# Patient Record
Sex: FEMALE | NOT HISPANIC OR LATINO | Employment: UNEMPLOYED | URBAN - METROPOLITAN AREA
[De-identification: names, ages, dates, MRNs, and addresses within clinical notes are randomized per-mention and may not be internally consistent; named-entity substitution may affect disease eponyms.]

---

## 2020-02-03 ENCOUNTER — HOSPITAL ENCOUNTER (OUTPATIENT)
Dept: PERIOP | Facility: HOSPITAL | Age: 25
Setting detail: HOSPITAL OUTPATIENT SURGERY
Discharge: HOME | End: 2020-02-03
Attending: OBSTETRICS & GYNECOLOGY

## 2020-03-26 ENCOUNTER — OFFICE VISIT CONVERTED (OUTPATIENT)
Dept: FAMILY MEDICINE CLINIC | Facility: CLINIC | Age: 25
End: 2020-03-26
Attending: PHYSICIAN ASSISTANT

## 2020-03-26 ENCOUNTER — CONVERSION ENCOUNTER (OUTPATIENT)
Dept: FAMILY MEDICINE CLINIC | Facility: CLINIC | Age: 25
End: 2020-03-26

## 2020-05-07 ENCOUNTER — OFFICE VISIT CONVERTED (OUTPATIENT)
Dept: FAMILY MEDICINE CLINIC | Facility: CLINIC | Age: 25
End: 2020-05-07
Attending: PHYSICIAN ASSISTANT

## 2021-05-13 NOTE — PROGRESS NOTES
Progress Note      Patient Name: Ashley Hoffman   Patient ID: [id number]   Sex: Female   YOB: 1995    Primary Care Provider: Tennille DEVINE   Referring Provider: Tennille DEVINE    Visit Date: May 7, 2020    Provider: SYBIL Avery   Location: [facility]   Location Address: [address] [place] [address], [address]  [place], [place]  [id number]   Location Phone: [phone]          Chief Complaint  · Pt here to f/u on anxiety      History Of Present Illness  Ashley Hoffman is a 25 year old female who presents for evaluation and treatment of:      Anxiety: Pt states since starting Celexa 10mg she hasn't reallty felt a difference, she feels about the same. Patient states that her symptoms range from throat tightening to hands tingling. She states that symptoms are not daily but occur several times a week. She complains of fatigue upon waking and sometimes wants to go back to bed. She does feel a bit down but wouldn't say depressed. She denies SI/HI.       Past Medical History  Disease Name Date Onset Notes   Anxiety disorder --  --          Past Surgical History  Procedure Name Date Notes   Dilatation and curettage --  --          Medication List  Name Date Started Instructions   Celexa 10 mg oral tablet 03/26/2020 take 1 tablet (10 mg) by oral route once daily for 30 days         Allergy List  Allergen Name Date Reaction Notes   NO KNOWN DRUG ALLERGIES --  --  --        Allergies Reconciled  Family Medical History  Disease Name Relative/Age Notes   *No Known Family History  --          Social History  Finding Status Start/Stop Quantity Notes   Alcohol Current some day --/-- socially --    Exercises regularly --  --/-- --  --    Single --  --/-- --  --    Tobacco Never --/-- --  --          Immunizations  NameDate Admin Mfg Trade Name Lot Number Route Inj VIS Given VIS Publication   InfluenzaRefused 03/26/2020 NE Not Entered  NE NE     Comments:          Review of  Systems  · Constitutional  o Admits  o : fatigue  o Denies  o : fever, weight loss, weight gain  · Cardiovascular  o Denies  o : lower extremity edema, claudication, chest pressure, palpitations  · Respiratory  o Denies  o : shortness of breath, wheezing, cough, hemoptysis, dyspnea on exertion  · Gastrointestinal  o Admits  o : nausea  o Denies  o : vomiting, diarrhea, constipation, abdominal pain  · Psychiatric  o Admits  o : anxiety  o Denies  o : suicidal ideation, homicidal ideation      Vitals  Date Time BP Position Site L\R Cuff Size HR RR TEMP (F) WT  HT  BMI kg/m2 BSA m2 O2 Sat        05/07/2020 09:42 /62 Sitting    81 - R  98.1 104lbs 0oz 5'   20.31 1.41 100 %          Physical Examination  · Constitutional  o Appearance  o : well developed, well-nourished, no acute distress  · Head and Face  o Head  o : normocephalic, atraumatic  · Neck  o Inspection/Palpation  o : normal appearance, no masses or tenderness, trachea midline  o Thyroid  o : gland size normal, nontender, no nodules or masses present on palpation  · Respiratory  o Respiratory Effort  o : breathing unlabored  o Inspection of Chest  o : chest rise symmetric bilaterally  o Auscultation of Lungs  o : clear to auscultation bilaterally throughout inspiration and expiration  · Cardiovascular  o Heart  o :   § Auscultation of Heart  § : regular rate and rhythm, no murmurs, gallops or rubs  o Peripheral Vascular System  o :   § Extremities  § : no edema  · Lymphatic  o Neck  o : no cervical lymphadenopathy, no supraclavicular lymphadenopathy  · Psychiatric  o Mood and Affect  o : mood normal, affect appropriate          Assessment  · Anxiety disorder     300.00/F41.9  Continue with Celexa at present. Obtain GeneSight testing.  · Fatigue     780.79/R53.83  Check labs.      Plan  · Orders  o CBC with Auto Diff [facility] (90372) - 780.79/R53.83 - 05/07/2020  o CMP [facility] (26367) - 780.79/R53.83 - 05/07/2020  o Thyroid Profile (29811, 85922, THYII) -  780.79/R53.83 - 05/07/2020  o B12 Folate levels (B12FO) - 780.79/R53.83 - 05/07/2020  o ACO-39: Current medications updated and reviewed () - - 05/07/2020  o [facility] - 300.00/F41.9 - 05/07/2020  · Medications  o Medications have been Reconciled  o Transition of Care or Provider Policy  · Instructions  o Patient was educated/instructed on their diagnosis, treatment and medications prior to discharge from the clinic today.  · Disposition  o Follow Up 1 month.            Electronically Signed by: SYBIL Avery -Author on May 7, 2020 10:42:44 AM

## 2021-05-15 VITALS
TEMPERATURE: 98.1 F | OXYGEN SATURATION: 100 % | HEART RATE: 81 BPM | SYSTOLIC BLOOD PRESSURE: 113 MMHG | WEIGHT: 104 LBS | DIASTOLIC BLOOD PRESSURE: 62 MMHG | HEIGHT: 60 IN | BODY MASS INDEX: 20.42 KG/M2

## 2021-05-15 VITALS
OXYGEN SATURATION: 100 % | HEART RATE: 79 BPM | BODY MASS INDEX: 20.42 KG/M2 | TEMPERATURE: 98.1 F | HEIGHT: 60 IN | DIASTOLIC BLOOD PRESSURE: 77 MMHG | WEIGHT: 104 LBS | SYSTOLIC BLOOD PRESSURE: 125 MMHG

## 2021-05-27 ENCOUNTER — OFFICE VISIT CONVERTED (OUTPATIENT)
Dept: FAMILY MEDICINE CLINIC | Facility: CLINIC | Age: 26
End: 2021-05-27
Attending: PHYSICIAN ASSISTANT

## 2021-05-27 ENCOUNTER — CONVERSION ENCOUNTER (OUTPATIENT)
Dept: FAMILY MEDICINE CLINIC | Facility: CLINIC | Age: 26
End: 2021-05-27

## 2021-06-06 NOTE — PROGRESS NOTES
Progress Note      Patient Name: sAhley Hoffman   Patient ID: [id number]   Sex: Female   YOB: 1995    Primary Care Provider: Tennille DEVINE   Referring Provider: Tennille DEVINE    Visit Date: May 27, 2021    Provider: SYBIL Avery   Location: [facility]   Location Address: [address] [place] [address], [address]  [place], [place]  [id number]   Location Phone: [phone]          Chief Complaint  · anxiety      History Of Present Illness  Ashley Hoffman is a 26 year old female who presents for evaluation and treatment of:      She is here today c/o anxiety, she states she get angry, she threw something across the room last night, she is having highs and lows. She has been on Celexa in the past but it didn't work. She went off of it when she was pregnant. Her PHQ 9 today is 20. She was last seen in March of 2020 with similar sx. She states that she feels she is on a roller coaster with some days feeling really well and some days down and depressed. She was on Celexa for about 2 months without improvement of sx. She became pregnant therefore discontinued medication. She delivered in 12/2020. Dr. Huber her OB/GYN thought that she was going through PPD and offered her medication but she declined. She is no longer breastfeeding. She is currently on the minipill. She had Genesight done last 5/2020. She is hesitant about counseling as she has been 2 times before and felt uncomfortable. She has good support with boyfriend and grandmother. Denies thoughts of self harm.       Past Medical History  Disease Name Date Onset Notes   Anxiety disorder --  --          Past Surgical History  Procedure Name Date Notes   Dilatation and curettage --  --          Allergy List  Allergen Name Date Reaction Notes   NO KNOWN DRUG ALLERGIES --  --  --        Allergies Reconciled  Family Medical History  Disease Name Relative/Age Notes   *No Known Family History  --          Social  History  Finding Status Start/Stop Quantity Notes   Alcohol Current some day --/-- socially --    Exercises regularly --  --/-- --  --    Single --  --/-- --  --    Tobacco Never --/-- --  --          Immunizations  NameDate Admin Mfg Trade Name Lot Number Route Inj VIS Given VIS Publication   Qofacxqiv58/15/2020 SKB Fluzone Quadrivalent  NE NE 05/27/2021    Comments:          Review of Systems  · Constitutional  o Admits  o : fatigue  o Denies  o : fever, weight loss, weight gain  · Cardiovascular  o Denies  o : lower extremity edema, claudication, chest pressure, palpitations  · Respiratory  o Denies  o : shortness of breath, wheezing, cough, hemoptysis, dyspnea on exertion  · Gastrointestinal  o Denies  o : nausea, vomiting, diarrhea, constipation, abdominal pain      Vitals  Date Time BP Position Site L\R Cuff Size HR RR TEMP (F) WT  HT  BMI kg/m2 BSA m2 O2 Sat FR L/min FiO2        05/27/2021 01:01 /60 Sitting    109 - R  97.8 110lbs 0oz 5'   21.48 1.45 98 %  21%          Physical Examination  · Constitutional  o Appearance  o : well developed, well-nourished, no acute distress  · Head and Face  o Head  o : normocephalic, atraumatic  · Neck  o Inspection/Palpation  o : normal appearance, no masses or tenderness, trachea midline  o Thyroid  o : gland size normal, nontender, no nodules or masses present on palpation  · Respiratory  o Respiratory Effort  o : breathing unlabored  o Inspection of Chest  o : chest rise symmetric bilaterally  o Auscultation of Lungs  o : clear to auscultation bilaterally throughout inspiration and expiration  · Cardiovascular  o Heart  o :   § Auscultation of Heart  § : regular rate and rhythm, no murmurs, gallops or rubs  o Peripheral Vascular System  o :   § Extremities  § : no edema  · Lymphatic  o Neck  o : no cervical lymphadenopathy, no supraclavicular lymphadenopathy  · Psychiatric  o Mood and Affect  o : mood normal, affect appropriate          Assessment  · Screening  for depression     V79.0/Z13.89  · Fatigue     780.79/R53.83  · Anxiety with depression     300.4/F41.8       Trial of Prozac 10mg qd. Adminstration and side effects were discussed. Discussed the need for counseling and gave handouts of emergency psych numbers and local therapists.     Problems Reconciled  Plan  · Orders  o Female Fatigue Panel (CBC, CMP, TSH, B12) [facility] ([id number], 68973, 78464, 46778) - 780.79/R53.83 - 05/27/2021  o ACO-18: Positive screen for clinical depression using a standardized tool and a follow-up plan documented () - - 05/27/2021   Started medication and checking lab  o ACO-14: Influenza immunization administered or previously received [facility] () - - 05/27/2021  o ACO-39: Current medications updated and reviewed (1159F, ) - - 05/27/2021  · Medications  o Prozac 10 mg oral capsule   SIG: take 1 capsule (10 mg) by oral route once daily for 30 days   DISP: (30) Capsule with 1 refills  Prescribed on 05/27/2021     o Medications have been Reconciled  o Transition of Care or Provider Policy  · Instructions  o Depression Screen completed and scanned into the EMR under the designated folder within the patient's documents.  o Today's PHQ-9 result is 20  o A list of local qualified behavioral health care centers, psychologists, and psychiatrists were given to the patient at the time of the visit today.  o Take all medications as prescribed/directed.  o Patient was educated/instructed on their diagnosis, treatment and medications prior to discharge from the clinic today.  o Electronically Identified Patient Education Materials Provided Electronically  · Disposition  o Follow up 6wks            Electronically Signed by: SYBIL Avery -Author on May 27, 2021 03:02:16 PM

## 2021-07-07 PROBLEM — F41.9 ANXIETY DISORDER: Status: ACTIVE | Noted: 2021-07-07

## 2021-07-07 RX ORDER — FLUOXETINE 10 MG/1
10 CAPSULE ORAL DAILY
COMMUNITY
End: 2021-07-08 | Stop reason: SDUPTHER

## 2021-07-08 ENCOUNTER — OFFICE VISIT (OUTPATIENT)
Dept: FAMILY MEDICINE CLINIC | Facility: CLINIC | Age: 26
End: 2021-07-08

## 2021-07-08 VITALS
HEIGHT: 60 IN | WEIGHT: 104 LBS | OXYGEN SATURATION: 99 % | DIASTOLIC BLOOD PRESSURE: 86 MMHG | HEART RATE: 90 BPM | BODY MASS INDEX: 20.42 KG/M2 | SYSTOLIC BLOOD PRESSURE: 120 MMHG

## 2021-07-08 DIAGNOSIS — Z76.0 ENCOUNTER FOR MEDICATION REFILL: ICD-10-CM

## 2021-07-08 DIAGNOSIS — F41.9 ANXIETY DISORDER, UNSPECIFIED TYPE: Primary | Chronic | ICD-10-CM

## 2021-07-08 PROCEDURE — 99213 OFFICE O/P EST LOW 20 MIN: CPT | Performed by: PHYSICIAN ASSISTANT

## 2021-07-08 RX ORDER — FLUOXETINE HYDROCHLORIDE 20 MG/1
20 CAPSULE ORAL DAILY
Qty: 30 CAPSULE | Refills: 5 | Status: SHIPPED | OUTPATIENT
Start: 2021-07-08 | End: 2021-11-11 | Stop reason: SDUPTHER

## 2021-07-08 RX ORDER — NORETHINDRONE 0.35 MG/1
1 TABLET ORAL DAILY
COMMUNITY
Start: 2021-05-08 | End: 2023-01-03

## 2021-07-08 NOTE — PROGRESS NOTES
"Chief Complaint  Anxiety (6 week f/u)    Subjective          Ashley Hoffman presents to [facility]  History of Present Illness  Pt presents today for 6 week follow up on anxiety.    Pt states since starting the fluoxetine she has had change for the better.  Pt states she is still having some anxiety would like to see about getting the dosage increased. Pt states she takes the medication in am and it does make her sleepy.    Pt has not had any covid vaccines    Labs 12/30/20        Objective   Vital Signs:   /86 (BP Location: Right arm)   Pulse 90   Ht 152.4 cm (60\")   Wt 47.2 kg (104 lb)   SpO2 99%   BMI 20.31 kg/m²     Physical Exam  Vitals and nursing note reviewed.   Constitutional:       Appearance: Normal appearance.   HENT:      Head: Normocephalic and atraumatic.   Neck:      Vascular: No carotid bruit.      Comments: Thyroid : gland size normal, nontender, no nodules or masses present on palpation   Cardiovascular:      Rate and Rhythm: Normal rate and regular rhythm.      Pulses: Normal pulses.      Heart sounds: Normal heart sounds.   Pulmonary:      Effort: Pulmonary effort is normal.      Breath sounds: Normal breath sounds.   Musculoskeletal:      Cervical back: Neck supple. No tenderness.      Right lower leg: No edema.      Left lower leg: No edema.   Lymphadenopathy:      Cervical: No cervical adenopathy.   Neurological:      Mental Status: She is alert.   Psychiatric:         Mood and Affect: Mood normal.         Behavior: Behavior normal.        Result Review :                     Assessment and Plan    Diagnoses and all orders for this visit:    1. Anxiety disorder, unspecified type (Primary)  Comments:  Better but still with symtpoms; increase Prozac to 20mg qd. F/u 4 mths or sooner if sx worsen.  Orders:  -     FLUoxetine (PROzac) 20 MG capsule; Take 1 capsule by mouth Daily.  Dispense: 30 capsule; Refill: 5    2. Encounter for medication refill     "     Follow Up   Return in about 4 months (around 11/8/2021).  Patient was given instructions and counseling regarding her condition or for health maintenance advice. Please see specific information pulled into the AVS if appropriate.     SYBIL Ellis

## 2021-07-15 VITALS
BODY MASS INDEX: 21.6 KG/M2 | HEART RATE: 109 BPM | HEIGHT: 60 IN | TEMPERATURE: 97.8 F | SYSTOLIC BLOOD PRESSURE: 102 MMHG | DIASTOLIC BLOOD PRESSURE: 60 MMHG | WEIGHT: 110 LBS | OXYGEN SATURATION: 98 %

## 2021-11-11 ENCOUNTER — OFFICE VISIT (OUTPATIENT)
Dept: FAMILY MEDICINE CLINIC | Facility: CLINIC | Age: 26
End: 2021-11-11

## 2021-11-11 VITALS
SYSTOLIC BLOOD PRESSURE: 131 MMHG | HEART RATE: 92 BPM | OXYGEN SATURATION: 97 % | BODY MASS INDEX: 19.08 KG/M2 | TEMPERATURE: 97.5 F | DIASTOLIC BLOOD PRESSURE: 71 MMHG | HEIGHT: 60 IN | WEIGHT: 97.2 LBS

## 2021-11-11 DIAGNOSIS — F41.9 ANXIETY DISORDER, UNSPECIFIED TYPE: Chronic | ICD-10-CM

## 2021-11-11 PROCEDURE — 99213 OFFICE O/P EST LOW 20 MIN: CPT | Performed by: PHYSICIAN ASSISTANT

## 2021-11-11 RX ORDER — FLUOXETINE HYDROCHLORIDE 20 MG/1
20 CAPSULE ORAL DAILY
Qty: 90 CAPSULE | Refills: 1 | Status: SHIPPED | OUTPATIENT
Start: 2021-11-11 | End: 2022-05-02 | Stop reason: SDUPTHER

## 2021-11-11 NOTE — PROGRESS NOTES
"Chief Complaint  Chief Complaint   Patient presents with   • Anxiety     4 month follow up       Subjective          Ashley Hoffman presents to [facility]  History of Present Illness     Patient is here for a 4 month follow up on her anxiety. She feels that she is doing well and the medication is working. Her only concern that she has is it has decreased her appetite. Patient has had a 7 lb weight loss since last office visit.    Medical History: has a past medical history of Anxiety disorder.   Surgical History: has a past surgical history that includes Dilation and curettage of uterus.   Family History: Family history is unknown by patient.   Social History: reports that she has never smoked. She has never used smokeless tobacco. She reports current alcohol use. She reports that she does not use drugs.    Allergies: Patient has no known allergies.    Health Maintenance Due   Topic Date Due   • ANNUAL PHYSICAL  Never done   • COVID-19 Vaccine (1) Never done   • HEPATITIS C SCREENING  Never done   • PAP SMEAR  Never done   • INFLUENZA VACCINE  08/01/2021       Immunization History   Administered Date(s) Administered   • Flu Vaccine Quad PF >18YRS 10/15/2020   • Flu Vaccine Quad PF >36MO 10/02/2017, 10/02/2017   • Flu Vaccine Split Quad 10/02/2017   • Flublok 18+yrs 11/10/2020   • HPV Quadrivalent 04/11/2014, 06/23/2014, 02/09/2015   • Hep A, 2 Dose 04/11/2014, 02/09/2015   • Influenza, Unspecified 10/15/2020, 11/10/2020   • Meningococcal B,(Bexsero) 04/11/2014   • Meningococcal MCV4P (Menactra) 04/11/2014   • Tdap 01/31/2011, 12/12/2020   • Varicella 03/14/2013       Objective     Vitals:    11/11/21 1330   BP: 131/71   BP Location: Right arm   Patient Position: Sitting   Cuff Size: Adult   Pulse: 92   Temp: 97.5 °F (36.4 °C)   TempSrc: Temporal   SpO2: 97%   Weight: 44.1 kg (97 lb 3.2 oz)   Height: 152.4 cm (60\")     Body mass index is 18.98 kg/m².       Physical Exam  Vitals and " nursing note reviewed.   Constitutional:       Appearance: Normal appearance. She is normal weight.   HENT:      Head: Normocephalic and atraumatic.   Neck:      Vascular: No carotid bruit.      Comments: Thyroid : gland size normal, nontender, no nodules or masses present on palpation   Cardiovascular:      Rate and Rhythm: Normal rate and regular rhythm.      Pulses: Normal pulses.      Heart sounds: Normal heart sounds.   Pulmonary:      Effort: Pulmonary effort is normal.      Breath sounds: Normal breath sounds.   Musculoskeletal:      Cervical back: Neck supple. No tenderness.      Right lower leg: No edema.      Left lower leg: No edema.   Lymphadenopathy:      Cervical: No cervical adenopathy.   Neurological:      Mental Status: She is alert.   Psychiatric:         Mood and Affect: Mood normal.         Behavior: Behavior normal.           Result Review :                           Assessment and Plan      Diagnoses and all orders for this visit:    1. Anxiety disorder, unspecified type  Assessment & Plan:  Stable on Prozac 20mg daily; continue. Discussed with patient to pay specific attention to eating regularly. Patient to follow up if weight continues to decrease. Follow up in 6mths.    Orders:  -     FLUoxetine (PROzac) 20 MG capsule; Take 1 capsule by mouth Daily.  Dispense: 90 capsule; Refill: 1          Follow Up     Return in about 6 months (around 5/11/2022).    Patient was given instructions and counseling regarding her condition or for health maintenance advice. Please see specific information pulled into the AVS if appropriate.

## 2021-11-15 NOTE — ASSESSMENT & PLAN NOTE
Stable on Prozac 20mg daily; continue. Discussed with patient to pay specific attention to eating regularly. Patient to follow up if weight continues to decrease. Follow up in 6mths.

## 2022-03-31 ENCOUNTER — TELEPHONE (OUTPATIENT)
Dept: FAMILY MEDICINE CLINIC | Facility: CLINIC | Age: 27
End: 2022-03-31

## 2022-03-31 NOTE — TELEPHONE ENCOUNTER
Dr. Davis's office (Podiatry) called and states the patient is needing a referral because she has passport insurance. The patient has not seen PCP since November 2021. Per Tennille, patient needs an appointment to obtain a referral for Podiatry.     Called and left a message on patient's cell phone to call us back.     Dr. Davis's office ph #: [phone]

## 2022-05-02 ENCOUNTER — OFFICE VISIT (OUTPATIENT)
Dept: FAMILY MEDICINE CLINIC | Facility: CLINIC | Age: 27
End: 2022-05-02

## 2022-05-02 VITALS
SYSTOLIC BLOOD PRESSURE: 101 MMHG | DIASTOLIC BLOOD PRESSURE: 60 MMHG | WEIGHT: 99.2 LBS | HEIGHT: 60 IN | BODY MASS INDEX: 19.48 KG/M2 | HEART RATE: 72 BPM | OXYGEN SATURATION: 99 %

## 2022-05-02 DIAGNOSIS — F41.9 ANXIETY DISORDER, UNSPECIFIED TYPE: Chronic | ICD-10-CM

## 2022-05-02 PROCEDURE — 99214 OFFICE O/P EST MOD 30 MIN: CPT | Performed by: PHYSICIAN ASSISTANT

## 2022-05-02 RX ORDER — FLUOXETINE 10 MG/1
10 CAPSULE ORAL DAILY
Qty: 90 CAPSULE | Refills: 1 | Status: SHIPPED | OUTPATIENT
Start: 2022-05-02 | End: 2023-01-03

## 2022-05-02 NOTE — PROGRESS NOTES
"Chief Complaint  Chief Complaint   Patient presents with   • Anxiety       Subjective          Ashley Hoffman presents to [facility]  History of Present Illness    Patient presents today for a 6 month follow up for anxiety.     Anxiety: Patient is currently prescribed Prozac 20MG, but stopped taking it on 3/31/22. Patient states that she felt emotionless and that the dosage was too high. Patient would like to go back on this medication, but at a lower dosage. Patient states increased anxiety about 2 weeks; increase panic; increased anxiety.    LABS: 2020  COVID: REFUSED  FLU: VACCINATED    Medical History: has a past medical history of Anxiety disorder.   Surgical History: has a past surgical history that includes Dilation and curettage of uterus.   Family History: Family history is unknown by patient.   Social History: reports that she has never smoked. She has never used smokeless tobacco. She reports current alcohol use. She reports that she does not use drugs.    Allergies: Patient has no known allergies.    Health Maintenance Due   Topic Date Due   • ANNUAL PHYSICAL  Never done   • COVID-19 Vaccine (1) Never done   • HEPATITIS C SCREENING  Never done       Immunization History   Administered Date(s) Administered   • Flu Vaccine Quad PF >36MO 10/02/2017, 10/02/2017   • Flu Vaccine Split Quad 10/02/2017   • Flublok 18+yrs 11/10/2020   • Fluzone Split Quad (Multi-dose) 10/15/2020   • HPV Quadrivalent 04/11/2014, 06/23/2014, 02/09/2015   • Hep A, 2 Dose 04/11/2014, 02/09/2015   • Influenza, Unspecified 10/15/2020, 11/10/2020   • Meningococcal B,(Bexsero) 04/11/2014   • Meningococcal MCV4P (Menactra) 04/11/2014   • Tdap 01/31/2011, 12/12/2020   • Varicella 03/14/2013       Objective     Vitals:    05/02/22 1229   BP: 101/60   BP Location: Right arm   Patient Position: Sitting   Cuff Size: Adult   Pulse: 72   SpO2: 99%   Weight: 45 kg (99 lb 3.2 oz)   Height: 152.4 cm (60\")     Body " mass index is 19.37 kg/m².     Wt Readings from Last 3 Encounters:   05/02/22 45 kg (99 lb 3.2 oz)   11/11/21 44.1 kg (97 lb 3.2 oz)   07/08/21 47.2 kg (104 lb)     BP Readings from Last 3 Encounters:   05/02/22 101/60   11/11/21 131/71   07/08/21 120/86       BMI is within normal parameters. No follow-up required.      Patient Care Team:  Tennille Mares PA as PCP - General (Family Medicine)    Physical Exam  Vitals and nursing note reviewed.   Constitutional:       Appearance: Normal appearance.   HENT:      Head: Normocephalic and atraumatic.   Neck:      Vascular: No carotid bruit.      Comments: Thyroid : gland size normal, nontender, no nodules or masses present on palpation   Cardiovascular:      Rate and Rhythm: Normal rate and regular rhythm.      Pulses: Normal pulses.      Heart sounds: Normal heart sounds.   Pulmonary:      Effort: Pulmonary effort is normal.      Breath sounds: Normal breath sounds.   Musculoskeletal:      Cervical back: Neck supple. No tenderness.   Lymphadenopathy:      Cervical: No cervical adenopathy.   Neurological:      Mental Status: She is alert.   Psychiatric:         Mood and Affect: Mood normal.         Behavior: Behavior normal.           Result Review :                           Assessment and Plan      Diagnoses and all orders for this visit:    1. Anxiety disorder, unspecified type  Comments:  Not controlled; restart Prozac 10mg daily; warned not to abruptly cease medication; follow up in 3mths.  Orders:  -     FLUoxetine (PROzac) 10 MG capsule; Take 1 capsule by mouth Daily.  Dispense: 90 capsule; Refill: 1            Follow Up     Return in about 6 months (around 11/2/2022).    Patient was given instructions and counseling regarding her condition or for health maintenance advice. Please see specific information pulled into the AVS if appropriate.

## 2022-11-29 ENCOUNTER — TELEPHONE (OUTPATIENT)
Dept: OBSTETRICS AND GYNECOLOGY | Facility: CLINIC | Age: 27
End: 2022-11-29

## 2022-11-29 DIAGNOSIS — Z32.00 ENCOUNTER FOR PREGNANCY TEST, RESULT UNKNOWN: ICD-10-CM

## 2022-11-29 DIAGNOSIS — O36.80X0 PREGNANCY WITH INCONCLUSIVE FETAL VIABILITY, SINGLE OR UNSPECIFIED FETUS: Primary | ICD-10-CM

## 2022-11-29 NOTE — TELEPHONE ENCOUNTER
This patient is established and has been scheduled for an Initial Ob Visit, with no problems noted, on 01/03/2023.  Orders pended for bhcg and dating ultrasound.  Please sign.  Thank you.

## 2022-12-16 ENCOUNTER — HOSPITAL ENCOUNTER (OUTPATIENT)
Dept: ULTRASOUND IMAGING | Facility: HOSPITAL | Age: 27
Discharge: HOME | End: 2022-12-16
Admitting: NURSE PRACTITIONER

## 2022-12-16 DIAGNOSIS — O36.80X0 PREGNANCY WITH INCONCLUSIVE FETAL VIABILITY, SINGLE OR UNSPECIFIED FETUS: ICD-10-CM

## 2022-12-16 PROCEDURE — 76801 OB US < 14 WKS SINGLE FETUS: CPT

## 2022-12-19 ENCOUNTER — TELEPHONE (OUTPATIENT)
Dept: OBSTETRICS AND GYNECOLOGY | Facility: CLINIC | Age: 27
End: 2022-12-19

## 2022-12-19 NOTE — TELEPHONE ENCOUNTER
----- Message from GUCCI Dowling sent at 12/19/2022 11:37 AM EST -----  Please let patient know her ultrasound shows a viable IUP measuring 6w6d, ACE by US of 8/5/23.  Please confirm LMP.  Heart rate was 145.  Recommend she keep her scheduled appointment.

## 2023-01-03 ENCOUNTER — INITIAL PRENATAL (OUTPATIENT)
Dept: OBSTETRICS AND GYNECOLOGY | Facility: CLINIC | Age: 28
End: 2023-01-03
Payer: COMMERCIAL

## 2023-01-03 VITALS — SYSTOLIC BLOOD PRESSURE: 115 MMHG | DIASTOLIC BLOOD PRESSURE: 73 MMHG | BODY MASS INDEX: 20.31 KG/M2 | WEIGHT: 104 LBS

## 2023-01-03 DIAGNOSIS — O99.330 TOBACCO USE DURING PREGNANCY, ANTEPARTUM: ICD-10-CM

## 2023-01-03 DIAGNOSIS — Z3A.09 9 WEEKS GESTATION OF PREGNANCY: ICD-10-CM

## 2023-01-03 DIAGNOSIS — Z34.80 PRENATAL CARE OF MULTIGRAVIDA, ANTEPARTUM: Primary | ICD-10-CM

## 2023-01-03 LAB
ABO GROUP BLD: NORMAL
B-HCG UR QL: POSITIVE
BLD GP AB SCN SERPL QL: NEGATIVE
EXPIRATION DATE: ABNORMAL
GLUCOSE UR STRIP-MCNC: NEGATIVE MG/DL
HBV SURFACE AG SERPL QL IA: NORMAL
HCV AB SER DONR QL: NORMAL
HIV1+2 AB SER QL: NORMAL
INTERNAL NEGATIVE CONTROL: NEGATIVE
INTERNAL POSITIVE CONTROL: POSITIVE
Lab: ABNORMAL
PROT UR STRIP-MCNC: NEGATIVE MG/DL
RH BLD: POSITIVE
T PALLIDUM IGG SER QL: NORMAL

## 2023-01-03 PROCEDURE — 99214 OFFICE O/P EST MOD 30 MIN: CPT | Performed by: NURSE PRACTITIONER

## 2023-01-03 PROCEDURE — 86901 BLOOD TYPING SEROLOGIC RH(D): CPT | Performed by: NURSE PRACTITIONER

## 2023-01-03 PROCEDURE — 87491 CHLMYD TRACH DNA AMP PROBE: CPT | Performed by: NURSE PRACTITIONER

## 2023-01-03 PROCEDURE — 87591 N.GONORRHOEAE DNA AMP PROB: CPT | Performed by: NURSE PRACTITIONER

## 2023-01-03 PROCEDURE — 87624 HPV HI-RISK TYP POOLED RSLT: CPT | Performed by: NURSE PRACTITIONER

## 2023-01-03 PROCEDURE — 87340 HEPATITIS B SURFACE AG IA: CPT | Performed by: NURSE PRACTITIONER

## 2023-01-03 PROCEDURE — 87661 TRICHOMONAS VAGINALIS AMPLIF: CPT | Performed by: NURSE PRACTITIONER

## 2023-01-03 PROCEDURE — G0123 SCREEN CERV/VAG THIN LAYER: HCPCS | Performed by: NURSE PRACTITIONER

## 2023-01-03 PROCEDURE — 86780 TREPONEMA PALLIDUM: CPT | Performed by: NURSE PRACTITIONER

## 2023-01-03 PROCEDURE — 81025 URINE PREGNANCY TEST: CPT | Performed by: NURSE PRACTITIONER

## 2023-01-03 PROCEDURE — 86803 HEPATITIS C AB TEST: CPT | Performed by: NURSE PRACTITIONER

## 2023-01-03 PROCEDURE — 86900 BLOOD TYPING SEROLOGIC ABO: CPT | Performed by: NURSE PRACTITIONER

## 2023-01-03 PROCEDURE — 85025 COMPLETE CBC W/AUTO DIFF WBC: CPT | Performed by: NURSE PRACTITIONER

## 2023-01-03 PROCEDURE — 86762 RUBELLA ANTIBODY: CPT | Performed by: NURSE PRACTITIONER

## 2023-01-03 PROCEDURE — 86850 RBC ANTIBODY SCREEN: CPT | Performed by: NURSE PRACTITIONER

## 2023-01-03 PROCEDURE — 87086 URINE CULTURE/COLONY COUNT: CPT | Performed by: NURSE PRACTITIONER

## 2023-01-03 PROCEDURE — 81001 URINALYSIS AUTO W/SCOPE: CPT | Performed by: NURSE PRACTITIONER

## 2023-01-03 PROCEDURE — G0432 EIA HIV-1/HIV-2 SCREEN: HCPCS | Performed by: NURSE PRACTITIONER

## 2023-01-03 NOTE — PROGRESS NOTES
OB Initial Visit    CC- Here for care of current pregnancy, first visit    Subjective:  27 y.o.  presenting for her first obstetrical visit.    LMP: Patient's last menstrual period was 10/28/2022.     COMPLAINS OF: Nausea    States nausea is getting better, denies need for medication at this time    Reviewed and updated:  OBHx, GYNHx (STDs), PMHx, Medications, Allergies, PSHx, Social Hx, Preventative Hx (PAP), Hx of abuse/safe environment, Vaccine Hx including hx of chickenpox or vaccine, Genetic Hx (pt, FOB, both families).        Objective:  /73   Wt 47.2 kg (104 lb)   LMP 10/28/2022   BMI 20.31 kg/m²      Urine pregnancy test is positive     General- NAD, alert and oriented, appropriate  Psych- Normal mood, good memory  Neck- No masses, no thyroid enlargement  CV- Regular rhythm, no murnurs  Resp- CTA to bases, no wheezes  Abdomen- Soft, non distended, non tender, no masses    Breast left- deferred  Breast right- deferred    External genitalia- Normal, no lesions  Urethra- Normal, no masses, non tender  Vagina- Normal, no discharge  Bladder- Normal, no masses, non tender  Cvx- Normal, no lesions, no discharge, no CMT  Uterus- Normal shape and consistency, non tender, Consistent with dates, Bedside US consistent with dates.  -160.  Adnexa- Normal, no mass, non tender    Lymphatic- No palpable neck, axillary, or groin nodes  Ext- No edema, no cyanosis    Skin- No lesions, no rashes, no acanthosis nigricans    Assessment and Plan:  9w3d  Diagnoses and all orders for this visit:    1. Prenatal care of multigravida, antepartum (Primary)  Overview:  ACE finalized: 23 per LMP, 6-week US and ACOG    Genetic testing (NIPS-Quad)/CF/AFP:  CF neg , discussed NIPS, AFP.      COVID: Recommended 1/3/23  Flu: Recommended 1/3/23  Tdap:    Rhogam:  ?Sterilization:    Anatomy US:  FU US:    PROBLEM LIST/PLAN:   Tobacco use - has cut back to a few puffs per day         Orders:  -     POC Urinalysis  Dipstick  -     IGP,CtNgTv,Apt HPV,rfx 16 / 18,45  -     OB Panel With HIV  -     Urinalysis With Microscopic - Urine, Clean Catch  -     Urine Culture - Urine, Urine, Random Void  -     Cancel: Hemoglobinopathy Fractionation [place]  -     POC Pregnancy, Urine    2. 9 weeks gestation of pregnancy  -     Cancel: Hemoglobinopathy Fractionation [place]    3. Tobacco use during pregnancy, antepartum  Overview:  Has cut back to a few puffs per day        Genetic Screening:   • Considering   • NIPS  • AFP only    Vaccines:   • Recommend FLU vaccine this season, R/B discussed  • Recommend COVID vaccine, R/B discussed    Counseling:   • Nutrition discussed, calories, activity/exercise in pregnancy  • Discussed dietary restrictions/safety food preparation in pregnancy  • Reviewed what to expect prenatal visits, office providers and [facility] hospitalists  • Appropriate trimester precautions provided, N/V, vag bleeding, cramping  • Questions answered    Labs:   • Prenatal labs, cultures, and PAP performed (prn)    Return in about 4 weeks (around 1/31/2023) for [facility] Office, OB follow up.      John Ward, APRN  01/03/2023    [facility] [place] [facility] [address]  [facility]  [address] [address]  Dept: [phone]  Loc: [phone]

## 2023-01-04 LAB
BACTERIA SPEC AEROBE CULT: NO GROWTH
BACTERIA UR QL AUTO: NORMAL /HPF
BASOPHILS # BLD AUTO: 0.04 10*3/MM3 (ref 0–0.2)
BASOPHILS NFR BLD AUTO: 0.3 % (ref 0–1.5)
BILIRUB UR QL STRIP: NEGATIVE
CLARITY UR: CLEAR
COLOR UR: YELLOW
DEPRECATED RDW RBC AUTO: 44.3 FL (ref 37–54)
EOSINOPHIL # BLD AUTO: 0.04 10*3/MM3 (ref 0–0.4)
EOSINOPHIL NFR BLD AUTO: 0.3 % (ref 0.3–6.2)
ERYTHROCYTE [DISTWIDTH] IN BLOOD BY AUTOMATED COUNT: 13 % (ref 12.3–15.4)
GLUCOSE UR STRIP-MCNC: NEGATIVE MG/DL
HCT VFR BLD AUTO: 39.5 % (ref 34–46.6)
HGB BLD-MCNC: 13 G/DL (ref 12–15.9)
HGB UR QL STRIP.AUTO: NEGATIVE
HYALINE CASTS UR QL AUTO: NORMAL /LPF
IMM GRANULOCYTES # BLD AUTO: 0.06 10*3/MM3 (ref 0–0.05)
IMM GRANULOCYTES NFR BLD AUTO: 0.5 % (ref 0–0.5)
KETONES UR QL STRIP: NEGATIVE
LEUKOCYTE ESTERASE UR QL STRIP.AUTO: NEGATIVE
LYMPHOCYTES # BLD AUTO: 2.09 10*3/MM3 (ref 0.7–3.1)
LYMPHOCYTES NFR BLD AUTO: 17.2 % (ref 19.6–45.3)
MCH RBC QN AUTO: 31 PG (ref 26.6–33)
MCHC RBC AUTO-ENTMCNC: 32.9 G/DL (ref 31.5–35.7)
MCV RBC AUTO: 94.3 FL (ref 79–97)
MONOCYTES # BLD AUTO: 0.46 10*3/MM3 (ref 0.1–0.9)
MONOCYTES NFR BLD AUTO: 3.8 % (ref 5–12)
NEUTROPHILS NFR BLD AUTO: 77.9 % (ref 42.7–76)
NEUTROPHILS NFR BLD AUTO: 9.43 10*3/MM3 (ref 1.7–7)
NITRITE UR QL STRIP: NEGATIVE
NRBC BLD AUTO-RTO: 0 /100 WBC (ref 0–0.2)
PH UR STRIP.AUTO: 6.5 [PH] (ref 5–8)
PLATELET # BLD AUTO: 353 10*3/MM3 (ref 140–450)
PMV BLD AUTO: 10.2 FL (ref 6–12)
PROT UR QL STRIP: NEGATIVE
RBC # BLD AUTO: 4.19 10*6/MM3 (ref 3.77–5.28)
RBC # UR STRIP: NORMAL /HPF
REF LAB TEST METHOD: NORMAL
SP GR UR STRIP: 1.01 (ref 1–1.03)
SQUAMOUS #/AREA URNS HPF: NORMAL /HPF
UROBILINOGEN UR QL STRIP: NORMAL
WBC # UR STRIP: NORMAL /HPF
WBC NRBC COR # BLD: 12.12 10*3/MM3 (ref 3.4–10.8)

## 2023-01-05 LAB — RUBV IGG SERPL IA-ACNC: 2.13 INDEX

## 2023-01-07 LAB
C TRACH RRNA CVX QL NAA+PROBE: NEGATIVE
CYTOLOGIST CVX/VAG CYTO: NORMAL
CYTOLOGY CVX/VAG DOC CYTO: NORMAL
CYTOLOGY CVX/VAG DOC THIN PREP: NORMAL
DX ICD CODE: NORMAL
HIV 1 & 2 AB SER-IMP: NORMAL
HPV GENOTYPE REFLEX: NORMAL
HPV I/H RISK 4 DNA CVX QL PROBE+SIG AMP: NEGATIVE
Lab: NORMAL
N GONORRHOEA RRNA CVX QL NAA+PROBE: NEGATIVE
OTHER STN SPEC: NORMAL
STAT OF ADQ CVX/VAG CYTO-IMP: NORMAL
T VAGINALIS RRNA SPEC QL NAA+PROBE: NEGATIVE

## 2023-01-09 ENCOUNTER — REFERRAL TRIAGE (OUTPATIENT)
Dept: LABOR AND DELIVERY | Facility: HOSPITAL | Age: 28
End: 2023-01-09
Payer: COMMERCIAL

## 2023-01-10 ENCOUNTER — LAB (OUTPATIENT)
Dept: OBSTETRICS AND GYNECOLOGY | Facility: CLINIC | Age: 28
End: 2023-01-10
Payer: COMMERCIAL

## 2023-01-10 DIAGNOSIS — Z34.81 PRENATAL CARE, SUBSEQUENT PREGNANCY IN FIRST TRIMESTER: ICD-10-CM

## 2023-01-10 DIAGNOSIS — Z3A.10 10 WEEKS GESTATION OF PREGNANCY: ICD-10-CM

## 2023-01-10 DIAGNOSIS — Z13.79 GENETIC SCREENING: Primary | ICD-10-CM

## 2023-01-10 PROCEDURE — 36415 COLL VENOUS BLD VENIPUNCTURE: CPT | Performed by: NURSE PRACTITIONER

## 2023-01-17 ENCOUNTER — TELEPHONE (OUTPATIENT)
Dept: OBSTETRICS AND GYNECOLOGY | Facility: CLINIC | Age: 28
End: 2023-01-17
Payer: COMMERCIAL

## 2023-01-30 LAB — REF LAB TEST METHOD: NORMAL

## 2023-02-06 ENCOUNTER — ROUTINE PRENATAL (OUTPATIENT)
Dept: OBSTETRICS AND GYNECOLOGY | Facility: CLINIC | Age: 28
End: 2023-02-06
Payer: COMMERCIAL

## 2023-02-06 VITALS — SYSTOLIC BLOOD PRESSURE: 113 MMHG | WEIGHT: 105 LBS | BODY MASS INDEX: 20.51 KG/M2 | DIASTOLIC BLOOD PRESSURE: 75 MMHG

## 2023-02-06 DIAGNOSIS — O21.9 NAUSEA AND VOMITING OF PREGNANCY, ANTEPARTUM: ICD-10-CM

## 2023-02-06 DIAGNOSIS — O99.330 TOBACCO USE DURING PREGNANCY, ANTEPARTUM: ICD-10-CM

## 2023-02-06 DIAGNOSIS — Z34.80 PRENATAL CARE OF MULTIGRAVIDA, ANTEPARTUM: Primary | ICD-10-CM

## 2023-02-06 LAB
GLUCOSE UR STRIP-MCNC: NEGATIVE MG/DL
PROT UR STRIP-MCNC: NEGATIVE MG/DL

## 2023-02-06 PROCEDURE — 99214 OFFICE O/P EST MOD 30 MIN: CPT | Performed by: OBSTETRICS & GYNECOLOGY

## 2023-02-06 RX ORDER — PRENATAL VIT NO.126/IRON/FOLIC 28MG-0.8MG
TABLET ORAL DAILY
COMMUNITY

## 2023-02-06 RX ORDER — ONDANSETRON 4 MG/1
4 TABLET, FILM COATED ORAL EVERY 6 HOURS PRN
Qty: 30 TABLET | Refills: 2 | Status: SHIPPED | OUTPATIENT
Start: 2023-02-06 | End: 2024-02-06

## 2023-02-06 NOTE — ASSESSMENT & PLAN NOTE
Reviewed prenatal labs.  Reviewed dating ultrasound and finalized EDC.  Continue prenatal vitamins.  Anatomy ultrasound next office visit

## 2023-02-06 NOTE — PROGRESS NOTES
OB FOLLOW UP    CC: Scheduled OB routine FU     Prenatal care complicated by:   Patient Active Problem List   Diagnosis   • Anxiety disorder   • Prenatal care of multigravida, antepartum   • Tobacco use during pregnancy, antepartum   • Nausea and vomiting of pregnancy, antepartum       Subjective:   Patient has: No leaking fluid, No vaginal bleeding, No contractions  No fetal movement yet.  The patient reports increased nausea and vomiting.  Having difficulty holding food down.  Desires something for nausea.    Objective:  Urine glucose/protein- see flow sheet      /75   Wt 47.6 kg (105 lb)   LMP 10/28/2022   BMI 20.51 kg/m²   See OB flow for LE edema, and cvx exam if applicable  FHT: 158 BPM   Uterine Size: size equals dates      Assessment and Plan:  Diagnoses and all orders for this visit:    1. Prenatal care of multigravida, antepartum (Primary)  Overview:  ACE finalized: 8/4/23 per LMP, 6-week US and ACOG    Genetic testing (NIPS-Quad)/CF/AFP:  CF neg 2020, discussed NIPS - negative, AFP.      COVID: Recommended 1/3/23  Flu: Recommended 1/3/23  Tdap:       Assessment & Plan:  Reviewed prenatal labs.  Reviewed dating ultrasound and finalized EDC.  Continue prenatal vitamins.  Anatomy ultrasound next office visit    Orders:  -     POC Urinalysis Dipstick  -     US Ob Detail Fetal Anatomy Single or First Gestation; Future    2. Tobacco use during pregnancy, antepartum  Assessment & Plan:  Recommend smoking cessation      3. Nausea and vomiting of pregnancy, antepartum  -     ondansetron (Zofran) 4 MG tablet; Take 1 tablet by mouth Every 6 (Six) Hours As Needed for Nausea or Vomiting.  Dispense: 30 tablet; Refill: 2        14w3d  Reassuring pregnancy progress    Counseling: Second trimester precautions  OB precautions, leaking, VB, yvonne bauman vs PTL/Labor    Questions answered    Return in about 5 weeks (around 3/13/2023) for Recheck.      Elie Rubio MD  02/06/2023

## 2023-03-14 ENCOUNTER — TELEPHONE (OUTPATIENT)
Dept: OBSTETRICS AND GYNECOLOGY | Facility: CLINIC | Age: 28
End: 2023-03-14
Payer: COMMERCIAL

## 2023-03-14 NOTE — TELEPHONE ENCOUNTER
DR. CASTILLO IS OUT OF THE OFFICE ON THE PM APPTS OF 3.20.  LVM FOR PT TO CALL BACK TO RESCHEDULE APPT.

## 2023-03-21 ENCOUNTER — ROUTINE PRENATAL (OUTPATIENT)
Dept: OBSTETRICS AND GYNECOLOGY | Facility: CLINIC | Age: 28
End: 2023-03-21
Payer: COMMERCIAL

## 2023-03-21 VITALS — WEIGHT: 112.2 LBS | SYSTOLIC BLOOD PRESSURE: 123 MMHG | DIASTOLIC BLOOD PRESSURE: 73 MMHG | BODY MASS INDEX: 21.91 KG/M2

## 2023-03-21 DIAGNOSIS — O21.9 NAUSEA AND VOMITING OF PREGNANCY, ANTEPARTUM: ICD-10-CM

## 2023-03-21 DIAGNOSIS — O99.330 TOBACCO USE DURING PREGNANCY, ANTEPARTUM: ICD-10-CM

## 2023-03-21 DIAGNOSIS — Z34.80 PRENATAL CARE OF MULTIGRAVIDA, ANTEPARTUM: Primary | ICD-10-CM

## 2023-03-21 PROCEDURE — 99214 OFFICE O/P EST MOD 30 MIN: CPT | Performed by: OBSTETRICS & GYNECOLOGY

## 2023-03-21 NOTE — ASSESSMENT & PLAN NOTE
Symptoms improved, but still has occasional nausea and vomiting.  Continue Zofran 4 mg every 6 hours as needed.

## 2023-03-21 NOTE — PROGRESS NOTES
OB FOLLOW UP    CC: Scheduled OB routine FU     Prenatal care complicated by:   Patient Active Problem List   Diagnosis   • Anxiety disorder   • Prenatal care of multigravida, antepartum   • Tobacco use during pregnancy, antepartum   • Nausea and vomiting of pregnancy, antepartum   • Placenta succenturiate lobe affecting fetus       Subjective:   Patient has: No complaints, No leaking fluid, No vaginal bleeding, No contractions, Adequate FM    Objective:  Urine glucose/protein- see flow sheet      /73   Wt 50.9 kg (112 lb 3.2 oz)   LMP 10/28/2022   BMI 21.91 kg/m²   See OB flow for LE edema, and cvx exam if applicable  FHT: 154 BPM   Uterine Size: 20 cm      Assessment and Plan:  Diagnoses and all orders for this visit:    1. Prenatal care of multigravida, antepartum (Primary)  Overview:  ACE finalized: 8/4/23 per LMP, 6-week US and ACOG    Genetic testing (NIPS-Quad)/CF/AFP:  CF neg 2020, discussed NIPS - negative, AFP.      COVID: Recommended 1/3/23  Flu: Recommended 1/3/23  Tdap:       Assessment & Plan:  Reviewed the anatomy ultrasound from yesterday.  Normal anatomy.  Succenturiate placental lobe noted.  Continue prenatal vitamins  1 hour GTT next office visit    Orders:  -     POC Urinalysis Dipstick    2. Tobacco use during pregnancy, antepartum  Assessment & Plan:  Smoking cessation      3. Nausea and vomiting of pregnancy, antepartum  Assessment & Plan:  Symptoms improved, but still has occasional nausea and vomiting.  Continue Zofran 4 mg every 6 hours as needed.      4. Placenta succenturiate lobe affecting fetus  Overview:  Noted on ultrasound 3/21/2023          20w4d  Reassuring pregnancy progress    Counseling: Second trimester precautions  OB precautions, leaking, VB, yvonne bauman vs PTL/Labor    Questions answered    Return in about 4 weeks (around 4/18/2023) for Recheck.      Elie Rubio MD  03/21/2023

## 2023-03-21 NOTE — ASSESSMENT & PLAN NOTE
Reviewed the anatomy ultrasound from yesterday.  Normal anatomy.  Succenturiate placental lobe noted.  Continue prenatal vitamins  1 hour GTT next office visit

## 2023-04-24 ENCOUNTER — ROUTINE PRENATAL (OUTPATIENT)
Dept: OBSTETRICS AND GYNECOLOGY | Facility: CLINIC | Age: 28
End: 2023-04-24
Payer: COMMERCIAL

## 2023-04-24 VITALS — BODY MASS INDEX: 23.63 KG/M2 | SYSTOLIC BLOOD PRESSURE: 118 MMHG | WEIGHT: 121 LBS | DIASTOLIC BLOOD PRESSURE: 74 MMHG

## 2023-04-24 DIAGNOSIS — Z34.80 PRENATAL CARE OF MULTIGRAVIDA, ANTEPARTUM: Primary | ICD-10-CM

## 2023-04-24 DIAGNOSIS — O99.330 TOBACCO USE DURING PREGNANCY, ANTEPARTUM: ICD-10-CM

## 2023-04-24 LAB
DEPRECATED RDW RBC AUTO: 39.7 FL (ref 37–54)
ERYTHROCYTE [DISTWIDTH] IN BLOOD BY AUTOMATED COUNT: 11.8 % (ref 12.3–15.4)
GLUCOSE 1H P GLC SERPL-MCNC: 99 MG/DL (ref 65–139)
GLUCOSE UR STRIP-MCNC: NEGATIVE MG/DL
HCT VFR BLD AUTO: 32.8 % (ref 34–46.6)
HGB BLD-MCNC: 10.9 G/DL (ref 12–15.9)
MCH RBC QN AUTO: 30.8 PG (ref 26.6–33)
MCHC RBC AUTO-ENTMCNC: 33.2 G/DL (ref 31.5–35.7)
MCV RBC AUTO: 92.7 FL (ref 79–97)
PLATELET # BLD AUTO: 272 10*3/MM3 (ref 140–450)
PMV BLD AUTO: 10.2 FL (ref 6–12)
PROT UR STRIP-MCNC: NEGATIVE MG/DL
RBC # BLD AUTO: 3.54 10*6/MM3 (ref 3.77–5.28)
WBC NRBC COR # BLD: 10.45 10*3/MM3 (ref 3.4–10.8)

## 2023-04-24 PROCEDURE — 82950 GLUCOSE TEST: CPT | Performed by: NURSE PRACTITIONER

## 2023-04-24 PROCEDURE — 85027 COMPLETE CBC AUTOMATED: CPT | Performed by: NURSE PRACTITIONER

## 2023-04-24 NOTE — PROGRESS NOTES
OB FOLLOW UP      Chief Complaint   Patient presents with   • Routine Prenatal Visit     1 hour gtt      Subjective:   • No complaints    Objective:  /74   Wt 54.9 kg (121 lb)   LMP 10/28/2022   BMI 23.63 kg/m²  10.9 kg (24 lb)  Uterine Size: size equals dates  FHT: 110-160 BPM    See OB flow for edema, cvx exam if performed, and Upro/Uglu    Assessment and Plan:  25w3d  Reassuring pregnancy progress.  Questions answered.  Diagnoses and all orders for this visit:    1. Prenatal care of multigravida, antepartum (Primary)  Overview:  ACE finalized: 23 per LMP, 6-week US and ACOG    Genetic testing (NIPS-Quad)/CF/AFP:  CF neg , discussed NIPS - negative, AFP.      COVID: Recommended 1/3/23  Flu: Recommended 1/3/23  Tdap: Prescription given 23      Assessment & Plan:  Doing well, no complaints  1hGTT today  Tdap prescription given  Fetal kick counts   labor precautions    Orders:  -     POC Urinalysis Dipstick  -     Gestational Diabetes Screen 1 Hour  -     CBC (No Diff)    2. Tobacco use during pregnancy, antepartum  Assessment & Plan:  Still smoking some, cessation encouraged      3. Placenta succenturiate lobe affecting fetus  Overview:  Noted on ultrasound 3/21/2023    Assessment & Plan:  Fundal height WNL, good movement reported      Counseling:    • OB precautions, leaking, VB, yvonne bauman vs PTL/Labor  • FKC  • Continue PNV.  Importance of healthy eating and staying active.    Return in about 4 weeks (around 2023) for [facility] Office, OB follow up.        John Ward, APRN  2023    [facility] [address]  [facility]  [address] [place] [address]  Dept: [phone]  Loc: [phone]

## 2023-04-24 NOTE — ASSESSMENT & PLAN NOTE
Doing well, no complaints  1hGTT today  Tdap prescription given  Fetal kick counts   labor precautions

## 2023-04-25 DIAGNOSIS — O99.019 MATERNAL ANEMIA IN PREGNANCY, ANTEPARTUM: Primary | ICD-10-CM

## 2023-04-25 RX ORDER — FERROUS SULFATE 325(65) MG
325 TABLET ORAL EVERY OTHER DAY
Qty: 15 TABLET | Refills: 4 | Status: SHIPPED | OUTPATIENT
Start: 2023-04-25 | End: 2023-09-22

## 2023-05-23 ENCOUNTER — ROUTINE PRENATAL (OUTPATIENT)
Dept: OBSTETRICS AND GYNECOLOGY | Facility: CLINIC | Age: 28
End: 2023-05-23
Payer: COMMERCIAL

## 2023-05-23 VITALS — BODY MASS INDEX: 23.44 KG/M2 | DIASTOLIC BLOOD PRESSURE: 73 MMHG | WEIGHT: 120 LBS | SYSTOLIC BLOOD PRESSURE: 113 MMHG

## 2023-05-23 DIAGNOSIS — Z34.80 PRENATAL CARE OF MULTIGRAVIDA, ANTEPARTUM: Primary | ICD-10-CM

## 2023-05-23 DIAGNOSIS — O99.330 TOBACCO USE DURING PREGNANCY, ANTEPARTUM: ICD-10-CM

## 2023-05-23 DIAGNOSIS — O99.019 MATERNAL ANEMIA IN PREGNANCY, ANTEPARTUM: ICD-10-CM

## 2023-05-23 LAB
GLUCOSE UR STRIP-MCNC: NEGATIVE MG/DL
PROT UR STRIP-MCNC: NEGATIVE MG/DL

## 2023-05-23 NOTE — ASSESSMENT & PLAN NOTE
Continue prenatal vitamins  Fetal kick counts   labor warnings  1 hour GTT was normal  I suspect the pain in the mons pubis area could be some diastases pubis from advancing gestation.  There is no palpable abnormality in the area today.  There is some tenderness with exam.  Recommended a pregnancy support belt.

## 2023-05-23 NOTE — PROGRESS NOTES
OB FOLLOW UP    CC: Scheduled OB routine FU     Prenatal care complicated by:   Patient Active Problem List   Diagnosis   • Anxiety disorder   • Prenatal care of multigravida, antepartum   • Tobacco use during pregnancy, antepartum   • Nausea and vomiting of pregnancy, antepartum   • Placenta succenturiate lobe affecting fetus   • Maternal anemia in pregnancy, antepartum       Subjective:   Patient has: No complaints, No leaking fluid, No vaginal bleeding, No contractions, Adequate FM  Complains of some pain in the left mons pubis area.  Has been going on for 2 months.  Only really hurts if she touches it.  Has noted some swelling in this area as well but this is off-and-on.    Objective:  Urine glucose/protein- see flow sheet      /73   Wt 54.4 kg (120 lb)   LMP 10/28/2022   BMI 23.44 kg/m²   See OB flow for LE edema, and cvx exam if applicable  FHT: 137 BPM   Uterine Size: 28 cm      Assessment and Plan:  Diagnoses and all orders for this visit:    1. Prenatal care of multigravida, antepartum (Primary)  Overview:  ACE finalized: 23 per LMP, 6-week US and ACOG    Genetic testing (NIPS-Quad)/CF/AFP:  CF neg , discussed NIPS - negative, AFP.      COVID: Recommended 1/3/23  Flu: Recommended 1/3/23  Tdap: Prescription given 23      Assessment & Plan:  Continue prenatal vitamins  Fetal kick counts   labor warnings  1 hour GTT was normal  I suspect the pain in the mons pubis area could be some diastases pubis from advancing gestation.  There is no palpable abnormality in the area today.  There is some tenderness with exam.  Recommended a pregnancy support belt.    Orders:  -     POC Urinalysis Dipstick    2. Placenta succenturiate lobe affecting fetus  Overview:  Noted on ultrasound 3/21/2023    Assessment & Plan:  Check growth ultrasound    Orders:  -     US Ob 14 + Weeks Single or First Gestation; Future    3. Maternal anemia in pregnancy, antepartum  Assessment & Plan:  Continue ferrous  sulfate every other day      4. Tobacco use during pregnancy, antepartum  Assessment & Plan:  Smoking cessation          29w4d  Reassuring pregnancy progress    Counseling: OB precautions, leaking, VB, yvonne bauman vs PTL/Labor  [facility]    Questions answered    Return in about 2 weeks (around 6/6/2023) for Recheck.      Elie Rubio MD  05/23/2023

## 2023-06-13 ENCOUNTER — ROUTINE PRENATAL (OUTPATIENT)
Dept: OBSTETRICS AND GYNECOLOGY | Facility: CLINIC | Age: 28
End: 2023-06-13
Payer: COMMERCIAL

## 2023-06-13 VITALS — WEIGHT: 121 LBS | BODY MASS INDEX: 23.63 KG/M2 | DIASTOLIC BLOOD PRESSURE: 67 MMHG | SYSTOLIC BLOOD PRESSURE: 117 MMHG

## 2023-06-13 DIAGNOSIS — Z34.80 PRENATAL CARE OF MULTIGRAVIDA, ANTEPARTUM: Primary | ICD-10-CM

## 2023-06-13 LAB
GLUCOSE UR STRIP-MCNC: NEGATIVE MG/DL
PROT UR STRIP-MCNC: NEGATIVE MG/DL

## 2023-06-14 NOTE — PROGRESS NOTES
OB FOLLOW UP  CC- Here for care of pregnancy        Ashley Hoffman is a 28 y.o.  32w5d patient being seen today for her obstetrical follow up visit. Patient reports no complaints.     Her prenatal care is complicated by (and status) :    Patient Active Problem List   Diagnosis    Anxiety disorder    Prenatal care of multigravida, antepartum    Tobacco use during pregnancy, antepartum    Nausea and vomiting of pregnancy, antepartum    Placenta succenturiate lobe affecting fetus    Maternal anemia in pregnancy, antepartum       Flu Status:   Covid Status:    ROS -   Patient Reports : No Problems  Patient Denies: Loss of Fluid and Vaginal Spotting  Fetal Movement : normal  All other systems reviewed and are negative.       The additional following portions of the patient's history were reviewed and updated as appropriate: allergies, current medications, past family history, past medical history, past social history, past surgical history, and problem list.    I have reviewed and agree with the HPI, ROS, and historical information as entered above. Marley Benson, DO    /67   Wt 54.9 kg (121 lb)   LMP 10/28/2022   BMI 23.63 kg/m²       EXAM:     FHT: 132 BPM   Uterine Size: size equals dates  Pelvic Exam: No    Urine glucose/protein: See prenatal flowsheet       Assessment and Plan  Diagnoses and all orders for this visit:    1. Prenatal care of multigravida, antepartum (Primary)  Overview:  ACE finalized: 23 per LMP, 6-week US and ACOG  F/U sono 23: EFW: 2169g (4lb 13oz) 64th%. FINA: 15.6 cm, Vtx, Ant placenta with Post succent lobe, Grade 1, female +     Genetic testing (NIPS-Quad)/CF/AFP:  CF neg , discussed NIPS - negative, AFP.      COVID: Recommended 1/3/23  Flu: Recommended 1/3/23  Tdap: Prescription given 23      Orders:  -     POC Urinalysis Dipstick         Pregnancy at 32w5d  Fetal status reassuring.   Activity and Exercise discussed.  Return in about 2 weeks (around  6/27/2023) for dr antoine.  Ramila counts danish Benson,   06/13/2023

## 2023-07-14 PROBLEM — O21.9 NAUSEA AND VOMITING OF PREGNANCY, ANTEPARTUM: Status: RESOLVED | Noted: 2023-02-06 | Resolved: 2023-07-14

## 2023-07-14 PROBLEM — O36.8390 FETAL TACHYCARDIA AFFECTING MANAGEMENT OF MOTHER: Status: ACTIVE | Noted: 2023-07-14

## 2023-07-18 ENCOUNTER — HOSPITAL ENCOUNTER (INPATIENT)
Facility: HOSPITAL | Age: 28
LOS: 1 days | Discharge: HOME | End: 2023-07-19
Attending: OBSTETRICS & GYNECOLOGY | Admitting: OBSTETRICS & GYNECOLOGY
Payer: COMMERCIAL

## 2023-07-18 PROBLEM — Z37.9 NORMAL LABOR: Status: ACTIVE | Noted: 2023-07-18

## 2023-07-18 LAB
ABO GROUP BLD: NORMAL
BASE EXCESS BLDCOA CALC-SCNC: -5.5 MMOL/L (ref -2–2)
BASE EXCESS BLDCOV CALC-SCNC: -4.9 MMOL/L (ref -2–2)
BLD GP AB SCN SERPL QL: NEGATIVE
DEPRECATED RDW RBC AUTO: 40.8 FL (ref 37–54)
ERYTHROCYTE [DISTWIDTH] IN BLOOD BY AUTOMATED COUNT: 13 % (ref 12.3–15.4)
HCO3 BLDCOA-SCNC: 20.1 MMOL/L
HCO3 BLDCOV-SCNC: 20.5 MMOL/L
HCT VFR BLD AUTO: 36.8 % (ref 34–46.6)
HGB BLD-MCNC: 12.2 G/DL (ref 12–15.9)
MCH RBC QN AUTO: 28.1 PG (ref 26.6–33)
MCHC RBC AUTO-ENTMCNC: 33.2 G/DL (ref 31.5–35.7)
MCV RBC AUTO: 84.8 FL (ref 79–97)
PCO2 BLDCOA: 39.7 MMHG (ref 33–49)
PCO2 BLDCOV: 39.5 MM HG (ref 28–40)
PH BLDCOA: 7.32 PH UNITS (ref 7.21–7.31)
PH BLDCOV: 7.33 PH UNITS (ref 7.31–7.37)
PLATELET # BLD AUTO: 332 10*3/MM3 (ref 140–450)
PMV BLD AUTO: 10.4 FL (ref 6–12)
PO2 BLDCOA: 27 MMHG
PO2 BLDCOV: 27.7 MM HG (ref 21–31)
RBC # BLD AUTO: 4.34 10*6/MM3 (ref 3.77–5.28)
RH BLD: POSITIVE
T&S EXPIRATION DATE: NORMAL
WBC NRBC COR # BLD: 13.34 10*3/MM3 (ref 3.4–10.8)

## 2023-07-18 PROCEDURE — 86850 RBC ANTIBODY SCREEN: CPT | Performed by: OBSTETRICS & GYNECOLOGY

## 2023-07-18 PROCEDURE — 86900 BLOOD TYPING SEROLOGIC ABO: CPT | Performed by: OBSTETRICS & GYNECOLOGY

## 2023-07-18 PROCEDURE — 86901 BLOOD TYPING SEROLOGIC RH(D): CPT | Performed by: OBSTETRICS & GYNECOLOGY

## 2023-07-18 PROCEDURE — 82803 BLOOD GASES ANY COMBINATION: CPT | Performed by: OBSTETRICS & GYNECOLOGY

## 2023-07-18 PROCEDURE — 25010000002 ONDANSETRON PER 1 MG: Performed by: OBSTETRICS & GYNECOLOGY

## 2023-07-18 PROCEDURE — 85027 COMPLETE CBC AUTOMATED: CPT | Performed by: OBSTETRICS & GYNECOLOGY

## 2023-07-18 PROCEDURE — 25010000002 HYDROMORPHONE 1 MG/ML SOLUTION: Performed by: OBSTETRICS & GYNECOLOGY

## 2023-07-18 PROCEDURE — 88307 TISSUE EXAM BY PATHOLOGIST: CPT | Performed by: OBSTETRICS & GYNECOLOGY

## 2023-07-18 RX ORDER — MAGNESIUM CARB/ALUMINUM HYDROX 105-160MG
30 TABLET,CHEWABLE ORAL ONCE
Status: DISCONTINUED | OUTPATIENT
Start: 2023-07-18 | End: 2023-07-18 | Stop reason: HOSPADM

## 2023-07-18 RX ORDER — CALCIUM CARBONATE 500 MG/1
1 TABLET, CHEWABLE ORAL 3 TIMES DAILY PRN
Status: DISCONTINUED | OUTPATIENT
Start: 2023-07-18 | End: 2023-07-19 | Stop reason: HOSPADM

## 2023-07-18 RX ORDER — OXYTOCIN/0.9 % SODIUM CHLORIDE 30/500 ML
250 PLASTIC BAG, INJECTION (ML) INTRAVENOUS CONTINUOUS
Status: ACTIVE | OUTPATIENT
Start: 2023-07-18 | End: 2023-07-18

## 2023-07-18 RX ORDER — TRISODIUM CITRATE DIHYDRATE AND CITRIC ACID MONOHYDRATE 500; 334 MG/5ML; MG/5ML
30 SOLUTION ORAL ONCE AS NEEDED
Status: DISCONTINUED | OUTPATIENT
Start: 2023-07-18 | End: 2023-07-18 | Stop reason: HOSPADM

## 2023-07-18 RX ORDER — HYDROCODONE BITARTRATE AND ACETAMINOPHEN 10; 325 MG/1; MG/1
1 TABLET ORAL EVERY 4 HOURS PRN
Status: DISCONTINUED | OUTPATIENT
Start: 2023-07-18 | End: 2023-07-19 | Stop reason: HOSPADM

## 2023-07-18 RX ORDER — SODIUM CHLORIDE 0.9 % (FLUSH) 0.9 %
10 SYRINGE (ML) INJECTION AS NEEDED
Status: DISCONTINUED | OUTPATIENT
Start: 2023-07-18 | End: 2023-07-18 | Stop reason: HOSPADM

## 2023-07-18 RX ORDER — ONDANSETRON 2 MG/ML
4 INJECTION INTRAMUSCULAR; INTRAVENOUS EVERY 6 HOURS PRN
Status: DISCONTINUED | OUTPATIENT
Start: 2023-07-18 | End: 2023-07-19 | Stop reason: HOSPADM

## 2023-07-18 RX ORDER — ONDANSETRON 4 MG/1
4 TABLET, FILM COATED ORAL EVERY 8 HOURS PRN
Status: DISCONTINUED | OUTPATIENT
Start: 2023-07-18 | End: 2023-07-18 | Stop reason: SDUPTHER

## 2023-07-18 RX ORDER — ONDANSETRON 4 MG/1
4 TABLET, FILM COATED ORAL EVERY 6 HOURS PRN
Status: DISCONTINUED | OUTPATIENT
Start: 2023-07-18 | End: 2023-07-18 | Stop reason: HOSPADM

## 2023-07-18 RX ORDER — CARBOPROST TROMETHAMINE 250 UG/ML
250 INJECTION, SOLUTION INTRAMUSCULAR
Status: DISCONTINUED | OUTPATIENT
Start: 2023-07-18 | End: 2023-07-19 | Stop reason: HOSPADM

## 2023-07-18 RX ORDER — FERROUS SULFATE 325(65) MG
325 TABLET ORAL 2 TIMES DAILY WITH MEALS
Status: DISCONTINUED | OUTPATIENT
Start: 2023-07-18 | End: 2023-07-19 | Stop reason: HOSPADM

## 2023-07-18 RX ORDER — LIDOCAINE HYDROCHLORIDE 10 MG/ML
5 INJECTION, SOLUTION EPIDURAL; INFILTRATION; INTRACAUDAL; PERINEURAL AS NEEDED
Status: DISCONTINUED | OUTPATIENT
Start: 2023-07-18 | End: 2023-07-18 | Stop reason: HOSPADM

## 2023-07-18 RX ORDER — ONDANSETRON 2 MG/ML
4 INJECTION INTRAMUSCULAR; INTRAVENOUS EVERY 6 HOURS PRN
Status: DISCONTINUED | OUTPATIENT
Start: 2023-07-18 | End: 2023-07-18 | Stop reason: HOSPADM

## 2023-07-18 RX ORDER — LIDOCAINE HYDROCHLORIDE 10 MG/ML
INJECTION, SOLUTION EPIDURAL; INFILTRATION; INTRACAUDAL; PERINEURAL
Status: COMPLETED
Start: 2023-07-18 | End: 2023-07-18

## 2023-07-18 RX ORDER — SODIUM CHLORIDE, SODIUM LACTATE, POTASSIUM CHLORIDE, CALCIUM CHLORIDE 600; 310; 30; 20 MG/100ML; MG/100ML; MG/100ML; MG/100ML
125 INJECTION, SOLUTION INTRAVENOUS CONTINUOUS
Status: DISCONTINUED | OUTPATIENT
Start: 2023-07-18 | End: 2023-07-18

## 2023-07-18 RX ORDER — IBUPROFEN 600 MG/1
600 TABLET ORAL EVERY 6 HOURS PRN
Status: DISCONTINUED | OUTPATIENT
Start: 2023-07-18 | End: 2023-07-18 | Stop reason: SDUPTHER

## 2023-07-18 RX ORDER — ACETAMINOPHEN 325 MG/1
650 TABLET ORAL EVERY 4 HOURS PRN
Status: DISCONTINUED | OUTPATIENT
Start: 2023-07-18 | End: 2023-07-18 | Stop reason: HOSPADM

## 2023-07-18 RX ORDER — ONDANSETRON 4 MG/1
4 TABLET, FILM COATED ORAL EVERY 6 HOURS PRN
Status: DISCONTINUED | OUTPATIENT
Start: 2023-07-18 | End: 2023-07-19 | Stop reason: HOSPADM

## 2023-07-18 RX ORDER — MISOPROSTOL 200 UG/1
800 TABLET ORAL ONCE AS NEEDED
Status: DISCONTINUED | OUTPATIENT
Start: 2023-07-18 | End: 2023-07-19 | Stop reason: HOSPADM

## 2023-07-18 RX ORDER — HYDROCODONE BITARTRATE AND ACETAMINOPHEN 5; 325 MG/1; MG/1
1 TABLET ORAL EVERY 4 HOURS PRN
Status: DISCONTINUED | OUTPATIENT
Start: 2023-07-18 | End: 2023-07-19 | Stop reason: HOSPADM

## 2023-07-18 RX ORDER — TERBUTALINE SULFATE 1 MG/ML
0.25 INJECTION, SOLUTION SUBCUTANEOUS AS NEEDED
Status: DISCONTINUED | OUTPATIENT
Start: 2023-07-18 | End: 2023-07-18 | Stop reason: HOSPADM

## 2023-07-18 RX ORDER — SODIUM CHLORIDE 0.9 % (FLUSH) 0.9 %
3 SYRINGE (ML) INJECTION EVERY 12 HOURS SCHEDULED
Status: DISCONTINUED | OUTPATIENT
Start: 2023-07-18 | End: 2023-07-18 | Stop reason: HOSPADM

## 2023-07-18 RX ORDER — OXYTOCIN/0.9 % SODIUM CHLORIDE 30/500 ML
PLASTIC BAG, INJECTION (ML) INTRAVENOUS
Status: COMPLETED
Start: 2023-07-18 | End: 2023-07-18

## 2023-07-18 RX ORDER — METHYLERGONOVINE MALEATE 0.2 MG/ML
200 INJECTION INTRAVENOUS ONCE AS NEEDED
Status: DISCONTINUED | OUTPATIENT
Start: 2023-07-18 | End: 2023-07-19 | Stop reason: HOSPADM

## 2023-07-18 RX ORDER — DOCUSATE SODIUM 100 MG/1
100 CAPSULE, LIQUID FILLED ORAL DAILY
Status: DISCONTINUED | OUTPATIENT
Start: 2023-07-18 | End: 2023-07-19 | Stop reason: HOSPADM

## 2023-07-18 RX ORDER — IBUPROFEN 600 MG/1
600 TABLET ORAL EVERY 6 HOURS PRN
Status: DISCONTINUED | OUTPATIENT
Start: 2023-07-18 | End: 2023-07-19 | Stop reason: HOSPADM

## 2023-07-18 RX ORDER — ACETAMINOPHEN 325 MG/1
650 TABLET ORAL EVERY 6 HOURS PRN
Status: DISCONTINUED | OUTPATIENT
Start: 2023-07-18 | End: 2023-07-19 | Stop reason: HOSPADM

## 2023-07-18 RX ORDER — OXYTOCIN/0.9 % SODIUM CHLORIDE 30/500 ML
999 PLASTIC BAG, INJECTION (ML) INTRAVENOUS ONCE
Status: DISCONTINUED | OUTPATIENT
Start: 2023-07-18 | End: 2023-07-19 | Stop reason: HOSPADM

## 2023-07-18 RX ADMIN — LIDOCAINE HYDROCHLORIDE 25 MG: 10 INJECTION, SOLUTION EPIDURAL; INFILTRATION; INTRACAUDAL; PERINEURAL at 04:30

## 2023-07-18 RX ADMIN — FERROUS SULFATE TAB 325 MG (65 MG ELEMENTAL FE) 325 MG: 325 (65 FE) TAB at 18:10

## 2023-07-18 RX ADMIN — Medication 30 UNITS: at 04:41

## 2023-07-18 RX ADMIN — DOCUSATE SODIUM 100 MG: 100 CAPSULE, LIQUID FILLED ORAL at 08:41

## 2023-07-18 RX ADMIN — HYDROMORPHONE HYDROCHLORIDE 0.5 MG: 1 INJECTION, SOLUTION INTRAMUSCULAR; INTRAVENOUS; SUBCUTANEOUS at 04:40

## 2023-07-18 RX ADMIN — IBUPROFEN 600 MG: 600 TABLET, FILM COATED ORAL at 10:00

## 2023-07-18 RX ADMIN — ACETAMINOPHEN 650 MG: 325 TABLET ORAL at 04:40

## 2023-07-18 RX ADMIN — FERROUS SULFATE TAB 325 MG (65 MG ELEMENTAL FE) 325 MG: 325 (65 FE) TAB at 08:41

## 2023-07-18 RX ADMIN — SODIUM CHLORIDE, POTASSIUM CHLORIDE, SODIUM LACTATE AND CALCIUM CHLORIDE 1000 ML: 600; 310; 30; 20 INJECTION, SOLUTION INTRAVENOUS at 03:11

## 2023-07-18 RX ADMIN — IBUPROFEN 600 MG: 600 TABLET, FILM COATED ORAL at 18:10

## 2023-07-18 RX ADMIN — ACETAMINOPHEN 650 MG: 325 TABLET ORAL at 14:39

## 2023-07-18 RX ADMIN — ONDANSETRON 4 MG: 2 INJECTION INTRAMUSCULAR; INTRAVENOUS at 11:35

## 2023-07-18 NOTE — PLAN OF CARE
Problem: Adult Inpatient Plan of Care  Goal: Plan of Care Review  Outcome: Ongoing, Progressing  Goal: Patient-Specific Goal (Individualized)  Outcome: Ongoing, Progressing  Goal: Absence of Hospital-Acquired Illness or Injury  Outcome: Ongoing, Progressing  Goal: Optimal Comfort and Wellbeing  Outcome: Ongoing, Progressing  Intervention: Monitor Pain and Promote Comfort  Recent Flowsheet Documentation  Taken 7/18/2023 1810 by Lynn Chou, RN  Pain Management Interventions: see MAR  Goal: Readiness for Transition of Care  Outcome: Ongoing, Progressing     Problem: Adjustment to Role Transition (Postpartum Vaginal Delivery)  Goal: Successful Maternal Role Transition  Outcome: Ongoing, Progressing     Problem: Bleeding (Postpartum Vaginal Delivery)  Goal: Hemostasis  Outcome: Ongoing, Progressing     Problem: Infection (Postpartum Vaginal Delivery)  Goal: Absence of Infection Signs/Symptoms  Outcome: Ongoing, Progressing     Problem: Pain (Postpartum Vaginal Delivery)  Goal: Acceptable Pain Control  Outcome: Ongoing, Progressing  Intervention: Prevent or Manage Pain  Recent Flowsheet Documentation  Taken 7/18/2023 1810 by Lynn Chou, RN  Pain Management Interventions: see MAR     Problem: Urinary Retention (Postpartum Vaginal Delivery)  Goal: Effective Urinary Elimination  Outcome: Ongoing, Progressing     Problem: Breastfeeding  Goal: Effective Breastfeeding  Outcome: Ongoing, Progressing   Goal Outcome Evaluation:

## 2023-07-18 NOTE — LACTATION NOTE
LC in to see this feeding. Infant needed a nipple shield to latch. She has a poor gape but when latched suckled regularly and swallows seen after a few minutes of suckling. LC encouraged patient to pump after this feeding.

## 2023-07-18 NOTE — H&P
TEO [facility]  Obstetric History and Physical    Chief Complaint   Patient presents with    Contractions       Subjective     HPI:    Patient is a 28 y.o. female  currently at 37w4d, who presents with contractions;  no LOF, vb;  good FM.    Her prenatal care is c/b succenturiate lobe of placenta;    Her previous obstetric/gynecological history is noted for is non-contributory.    The following portions of the patients history were reviewed and updated as appropriate:   current medications, allergies, past medical history, past surgical history, past family history, past social history and current problem list.     Prenatal Information:  Prenatal Results       Initial Prenatal Labs       Test Value Reference Range Date Time    Hemoglobin  13.0 g/dL 12.0 - 15.9 23 1043    Hematocrit  39.5 % 34.0 - 46.6 23 1043    Platelets  353 10*3/mm3 140 - 450 23 1043    Rubella IgG  2.13 index Immune >0.99 23 1043    Hepatitis B SAg  Non-Reactive  Non-Reactive 23 1043    Hepatitis C Ab  Non-Reactive  Non-Reactive 23 1043    RPR        T. Pallidum Ab   Non-Reactive  Non-Reactive 23 1043    ABO  O   23 1043    Rh  Positive   23 1043    Antibody Screen  Negative   23 1043    HIV  Non-Reactive  Non-Reactive 23 1043    Urine Culture  No growth   23 1043    Gonorrhea  Negative  Negative 23 1043    Chlamydia  Negative  Negative 23 1043    TSH        HgB A1c         Varicella IgG        HgB Electrophoresis         Cystic fibrosis                   Fetal testing        Test Value Reference Range Date Time    NIPT        MSAFP        AFP-4                  2nd and 3rd Trimester       Test Value Reference Range Date Time    Hemoglobin (repeated)  10.9 g/dL 12.0 - 15.9 23 1005    Hematocrit (repeated)  32.8 % 34.0 - 46.6 23 1005    Platelets   272 10*3/mm3 140 - 450 23 1005       353 10*3/mm3 140 - 450 23 1043    GCT  99 mg/dL 65 -  139 04/24/23 1005    Antibody Screen (repeated)  Negative   01/03/23 1043    GTT Fasting        GTT 1 Hr        GTT 2 Hr        GTT 3 Hr        Group B Strep  Negative  Negative 07/14/23 1009              Other testing        Test Value Reference Range Date Time    Parvo IgG         CMV IgG                   Drug Screening       Test Value Reference Range Date Time    Amphetamine Screen        Barbiturate Screen        Benzodiazepine Screen        Methadone Screen        Phencyclidine Screen        Opiates Screen        THC Screen        Cocaine Screen        Propoxyphene Screen        Buprenorphine Screen        Methamphetamine Screen        Oxycodone Screen        Tricyclic Antidepressants Screen                  Legend    ^: Historical                          External Prenatal Results       Pregnancy Outside Results - Transcribed From Office Records - See Scanned Records For Details       Test Value Date Time    ABO  O  01/03/23 1043    Rh  Positive  01/03/23 1043    Antibody Screen  Negative  01/03/23 1043    Varicella IgG       Rubella  2.13 index 01/03/23 1043    Hgb  10.9 g/dL 04/24/23 1005       13.0 g/dL 01/03/23 1043    Hct  32.8 % 04/24/23 1005       39.5 % 01/03/23 1043    Glucose Fasting GTT       Glucose Tolerance Test 1 hour       Glucose Tolerance Test 3 hour       Gonorrhea (discrete)  Negative  01/03/23 1043    Chlamydia (discrete)  Negative  01/03/23 1043    RPR       VDRL       Syphilis Antibody       HBsAg  Non-Reactive  01/03/23 1043    Herpes Simplex Virus PCR       Herpes Simplex VIrus Culture       HIV  Non-Reactive  01/03/23 1043    Hep C RNA Quant PCR       Hep C Antibody  Non-Reactive  01/03/23 1043    AFP       Group B Strep  Negative  07/14/23 1009    GBS Susceptibility to Clindamycin       GBS Susceptibility to Erythromycin       Fetal Fibronectin       Genetic Testing, Maternal Blood                 Drug Screening       Test Value Date Time    Urine Drug Screen       Amphetamine  Screen       Barbiturate Screen       Benzodiazepine Screen       Methadone Screen       Phencyclidine Screen       Opiates Screen       THC Screen       Cocaine Screen       Propoxyphene Screen       Buprenorphine Screen       Methamphetamine Screen       Oxycodone Screen       Tricyclic Antidepressants Screen                 Legend    ^: Historical                             Past OB History:     OB History    Para Term  AB Living   3 1 1 0 1 1   SAB IAB Ectopic Molar Multiple Live Births   1 0 0 0 0 1      # Outcome Date GA Lbr Rodney/2nd Weight Sex Delivery Anes PTL Lv   3 Current            2 Term 20 38w0d  2381 g (5 lb 4 oz) F Vag-Spont   MARCELLUS   1 SAB 2020 7w0d    SAB         Birth Comments: D & C       Past Medical History: Past Medical History:   Diagnosis Date    Anxiety disorder       Past Surgical History Past Surgical History:   Procedure Laterality Date    DILATATION AND CURETTAGE        Family History: Family History   Problem Relation Age of Onset    Hypertension Paternal Grandmother       Social History:  reports that she has been smoking cigarettes. She has been smoking an average of .25 packs per day. She has never used smokeless tobacco.   reports that she does not currently use alcohol.   reports no history of drug use.        General ROS: Pertinent items are noted in HPI  Home Medications:  ferrous sulfate and prenatal vitamin    Allergies:  No Known Allergies    Objective       Vital Signs Range for the last 24 hours  Temperature:     Temp Source:     BP: BP: (101)/(54) 101/54   Pulse: Heart Rate:  [67] 67   Respirations: Resp:  [18] 18   SPO2: SpO2:  [100 %] 100 %     Physical Examination:   General appearance - alert, well appearing, and in no distress  Mental status - alert, oriented to person, place, and time  Abdomen - soft, nontender, nondistended,   Pelvic - normal external genitalia, vulva, vagina, cervix, and uterus   Back exam - full range of motion, no tenderness or  pain on motion  Neurological - alert, oriented, normal speech  Extremities - peripheral pulses normal  Skin - normal coloration and turgor, no suspicious skin lesions noted    Presentation: vtx   Cervix: Method: sterile exam per RN   Dilation: Cervical Dilation (cm): 6-7   Effacement: Cervical Effacement: 80%   Station:         Fetal Heart Rate Assessment :  130s, GLTV, no decels  Method:     Beats/min:     Baseline:     Variability:     Accels:     Decels:     Tracing Category:       Uterine Assessment :  q 2min  Method:     Frequency (min):     Ctx Count in 10 min:     Duration:     Intensity:     [place] Units:       GBS is negative     Assessment & Plan       Normal labor        Assessment:  1.  Intrauterine pregnancy at 37w4d gestation with reactive fetal status.    2.  labor  without ROM  3.  Obstetrical history significant for is non-contributory.  4.  GBS status:   Group B Strep, DNA   Date Value Ref Range Status   07/14/2023 Negative Negative Final       Plan:  1. Vaginal anticipated  2.  Plan of care has been reviewed with patient and patient agrees.   3.  Risks, benefits of treatment plan have been discussed.  4.  All questions have been answered.        Electronically signed by Darlene Thurston MD, 07/18/23, 3:42 AM EDT.

## 2023-07-18 NOTE — L&D DELIVERY NOTE
[facility]   Vaginal Delivery Note    Patient Name: Ashley Hoffman  : 1995  MRN: [id number]    Date of Delivery: 2023     Diagnosis     Pre & Post-Delivery:  Intrauterine pregnancy at 37w4d  Labor status: Spontaneous Onset of Labor     Normal labor             Problem List    Transfer to Postpartum     Review the Delivery Report for details.     Delivery     Delivery: Vaginal, Spontaneous     YOB: 2022    Time of Birth:  Gestational Age 3:57 AM   37w4d     Anesthesia: Epidural     Delivering clinician: Sierra Giron    Forceps?   No   Vacuum? No    Shoulder dystocia present: No        Delivery narrative: PROM.  Precipitously changed from 6 cm to complete.  Received 1% lidocaine in the peritoneum.  Progressed to have a spontaneous vaginal delivery of a liveborn female infant over a right mediolateral episiotomy nuchal cord x1 nonreducible clamped in 2 places cut in between.  The infant was delivered in a controlled fashion.  She was bulb suction and laid on the mother's abdomen where she was attended to by the nursery personnel.  Samples of blood were collected from the umbilical cord for arterial and venous blood gas analysis and Domingo testing.  There was spontaneous delivery of an intact placenta with a succenturiate lobe and a three-vessel cord.  Additional local anesthesia was administered for repair.  Repair of episiotomy was carried out with 3-0 Monocryl in layers for good reapproximation and hemostasis.  The uterus was massaged and the cavity explored.  The placenta is sent to pathology for analysis.  The infant was transported to NICU for transition.  Mom and infant are in stable condition.   cc.      Infant     Findings: female  infant     Infant observations: Weight: 2635 g (5 lb 13 oz)   Length: 19  in  Observations/Comments:        Apgars: 7  @ 1 minute /    9  @ 5 minutes   Infant Name: Baby girl Dalton     Placenta & Cord         Placenta delivered    at    7/18/2023  4:00 AM     Cord: 3 vessels  present.   Nuchal Cord?  yes; Number of nuchal loops present:  1    Cord blood obtained:     Cord gases obtained:      Cord gas results: Venous:    pH, Cord Venous   Date Value Ref Range Status   07/18/2023 7.333 7.310 - 7.370 pH Units Final       Arterial:    pH, Cord Arterial   Date Value Ref Range Status   07/18/2023 7.32 (H) 7.21 - 7.31 pH Units Final        Repair     Episiotomy: Median     Yes  Suture used for repair: 3-0 Monocryl   Lacerations: No   Estimated Blood Loss: Est. Blood Loss (mL): 300 mL (Filed from Delivery Summary) (07/18/23 5027)     Quantitative Blood Loss:          Complications     none    Disposition     Mother to Remain in LD  in stable condition currently.  Baby to NICU  in stable condition currently.    Sierra Giron MD  07/18/23  04:47 EDT

## 2023-07-18 NOTE — NURSING NOTE
37w4d presents with reports of painful contractions since about 11:30 pm. Denies vaginal bleeding but reports possible leaking of fluid and + fetal movement. Two patient id verified, efm and uc toco applied abdomen palpates strong uc with soft resting tone. SVE 6-7cm, clear fluid noted. Requesting epidural. Transferred to L&D room 445 via stretcher for admission.

## 2023-07-19 VITALS
TEMPERATURE: 97.9 F | HEART RATE: 66 BPM | SYSTOLIC BLOOD PRESSURE: 108 MMHG | OXYGEN SATURATION: 100 % | DIASTOLIC BLOOD PRESSURE: 64 MMHG | HEIGHT: 60 IN | RESPIRATION RATE: 18 BRPM | BODY MASS INDEX: 23.4 KG/M2

## 2023-07-19 RX ORDER — IBUPROFEN 600 MG/1
600 TABLET ORAL EVERY 6 HOURS PRN
Qty: 20 TABLET | Refills: 0 | Status: SHIPPED | OUTPATIENT
Start: 2023-07-19

## 2023-07-19 RX ADMIN — DOCUSATE SODIUM 100 MG: 100 CAPSULE, LIQUID FILLED ORAL at 08:57

## 2023-07-19 RX ADMIN — FERROUS SULFATE TAB 325 MG (65 MG ELEMENTAL FE) 325 MG: 325 (65 FE) TAB at 08:57

## 2023-07-19 RX ADMIN — IBUPROFEN 600 MG: 600 TABLET, FILM COATED ORAL at 06:03

## 2023-07-19 NOTE — CONSULTS
"Discharge Planning Assessment  [facility]     Patient Name: Ashley Hoffman  MRN: [id number]  Today's Date: 7/19/2023    Admit Date: 7/18/2023    Plan: JACINTA met with pt at the bedside, provided an introduction and asked if it was okay to complete an assessment with her, pt agreed. Pt reports that she lives in [facility] with her boyfriend, Brennan. She reports that she has a 2 year old that lives with them, but is with her aunt while she is at the hospital. Pt states that she has a good support system and has all the supplies needed to care for baby. Pt states that she is not on WIC or in HANDS. Pt reports that infant will follow up with Lyn Johnson at [facility] after discharge. Pt denies any depression but reports a hx of anxiety. She states that she is currently off her meds, but plans to go back on them. She states that she sees Tennille Mares to manage her anxiety and feels well supported by her and plans to see her soon to get meds restarted. When asked about how she is feeling, she states \"I'm good right now\". Pt denies any suicidal or homicidal ideations. Pt states that they have transportation, she does not work, and FOB is self-employed. She denies any substance abuse. No concerns at this time.   Discharge Needs Assessment    No documentation.                  Discharge Plan       Row Name 07/19/23 1227       Plan    Plan JACINTA met with pt at the bedside, provided an introduction and asked if it was okay to complete an assessment with her, pt agreed. Pt reports that she lives in [facility] with her boyfriend, Brennan. She reports that she has a 2 year old that lives with them, but is with her aunt while she is at the hospital. Pt states that she has a good support system and has all the supplies needed to care for baby. Pt states that she is not on WIC or in HANDS. Pt reports that infant will follow up with Lyn Johnson at [facility] after discharge. Pt denies any depression but reports a hx of " "anxiety. She states that she is currently off her meds, but plans to go back on them. She states that she sees Tennille Mares to manage her anxiety and feels well supported by her and plans to see her soon to get meds restarted. When asked about how she is feeling, she states \"I'm good right now\". Pt denies any suicidal or homicidal ideations. Pt states that they have transportation, she does not work, and FOB is self-employed. She denies any substance abuse. No concerns at this time.    Final Discharge Disposition Code 01 - home or self-care                  Continued Care and Services - Admitted Since 7/18/2023    Coordination has not been started for this encounter.       Selected Continued Care - Episodes Includes continued care and service providers with selected services from the active episodes listed below      Motherhood Connection Episode start date: 1/9/2023   There are no active outsourced providers for this episode.                    Demographic Summary    No documentation.                  Functional Status    No documentation.                  Psychosocial    No documentation.                  Abuse/Neglect    No documentation.                  Legal    No documentation.                  Substance Abuse    No documentation.                  Patient Forms    No documentation.                     KATELYN Corbin    "

## 2023-07-19 NOTE — PLAN OF CARE
Problem: Adult Inpatient Plan of Care  Goal: Plan of Care Review  Outcome: Ongoing, Progressing  Goal: Patient-Specific Goal (Individualized)  Outcome: Ongoing, Progressing  Goal: Absence of Hospital-Acquired Illness or Injury  Outcome: Ongoing, Progressing  Intervention: Identify and Manage Fall Risk  Recent Flowsheet Documentation  Taken 7/19/2023 0330 by Shelia Honeycutt RN  Safety Promotion/Fall Prevention: safety round/check completed  Taken 7/19/2023 0240 by Shelia Honeycutt RN  Safety Promotion/Fall Prevention: safety round/check completed  Taken 7/19/2023 0000 by Shelia Honeycutt RN  Safety Promotion/Fall Prevention: safety round/check completed  Taken 7/18/2023 2200 by Shelia Honeycutt RN  Safety Promotion/Fall Prevention: safety round/check completed  Taken 7/18/2023 2000 by Shelia Honeycutt RN  Safety Promotion/Fall Prevention: safety round/check completed  Intervention: Prevent and Manage VTE (Venous Thromboembolism) Risk  Recent Flowsheet Documentation  Taken 7/18/2023 2109 by Shelia Honeycutt RN  Activity Management: up ad chance  Goal: Optimal Comfort and Wellbeing  Outcome: Ongoing, Progressing  Intervention: Provide Person-Centered Care  Recent Flowsheet Documentation  Taken 7/18/2023 1910 by Shelia Honeycutt RN  Trust Relationship/Rapport:   care explained   choices provided   emotional support provided   empathic listening provided   questions answered   questions encouraged   reassurance provided   thoughts/feelings acknowledged  Goal: Readiness for Transition of Care  Outcome: Ongoing, Progressing     Problem: Adjustment to Role Transition (Postpartum Vaginal Delivery)  Goal: Successful Maternal Role Transition  Outcome: Ongoing, Progressing     Problem: Bleeding (Postpartum Vaginal Delivery)  Goal: Hemostasis  Outcome: Ongoing, Progressing     Problem: Infection (Postpartum Vaginal Delivery)  Goal: Absence of Infection Signs/Symptoms  Outcome: Ongoing, Progressing  Intervention:  Prevent or Manage Infection  Recent Flowsheet Documentation  Taken 7/18/2023 2109 by Shelia Honeycutt, RN  Perineal Care:   perineal spray bottle/warm water use encouraged   perineum cleansed   perineal hygiene encouraged   absorbent brief/pad changed     Problem: Pain (Postpartum Vaginal Delivery)  Goal: Acceptable Pain Control  Outcome: Ongoing, Progressing     Problem: Urinary Retention (Postpartum Vaginal Delivery)  Goal: Effective Urinary Elimination  Outcome: Ongoing, Progressing     Problem: Breastfeeding  Goal: Effective Breastfeeding  Outcome: Ongoing, Progressing   Goal Outcome Evaluation:         Resting   Infant in nicu  SO at bedside  Breast pump at bedside

## 2023-07-19 NOTE — PLAN OF CARE
Problem: Adult Inpatient Plan of Care  Goal: Plan of Care Review  Outcome: Met  Goal: Patient-Specific Goal (Individualized)  Outcome: Met  Goal: Absence of Hospital-Acquired Illness or Injury  Outcome: Met  Intervention: Identify and Manage Fall Risk  Recent Flowsheet Documentation  Taken 7/19/2023 1300 by Nuris Chamberlain, RN  Safety Promotion/Fall Prevention: safety round/check completed  Taken 7/19/2023 0900 by Nuris Chamberlain, RN  Safety Promotion/Fall Prevention: safety round/check completed  Intervention: Prevent and Manage VTE (Venous Thromboembolism) Risk  Recent Flowsheet Documentation  Taken 7/19/2023 0900 by Nuris Chamberlain RN  Activity Management: up ad chance  Goal: Optimal Comfort and Wellbeing  Outcome: Met  Goal: Readiness for Transition of Care  Outcome: Met     Problem: Adjustment to Role Transition (Postpartum Vaginal Delivery)  Goal: Successful Maternal Role Transition  Outcome: Met     Problem: Bleeding (Postpartum Vaginal Delivery)  Goal: Hemostasis  Outcome: Met     Problem: Infection (Postpartum Vaginal Delivery)  Goal: Absence of Infection Signs/Symptoms  Outcome: Met     Problem: Pain (Postpartum Vaginal Delivery)  Goal: Acceptable Pain Control  Outcome: Met     Problem: Urinary Retention (Postpartum Vaginal Delivery)  Goal: Effective Urinary Elimination  Outcome: Met     Problem: Breastfeeding  Goal: Effective Breastfeeding  Outcome: Met   Goal Outcome Evaluation:

## 2023-07-19 NOTE — DISCHARGE SUMMARY
[facility]  Delivery Discharge Summary    Primary OB Clinician:     EDC: Estimated Date of Delivery: 23    Admitting Diagnosis:  Normal labor [O80, Z37.9]    Discharge Diagnosis:  Same as Admitting plus:   Pregnancy at 37w4d - Delivered     Antepartum complications: none    Date of Delivery: 2023   Time of Delivery: 3:57 AM     Delivered By:  Sierra Giron     Delivery Type: Vaginal, Spontaneous      Tubal Ligation: n/a    Baby:female  infant;   Apgar:  7  @ 1 minute /   Apgar:  9  @ 5 minutes   Weight: 2635 g (5 lb 13 oz)    Length: 19     Anesthesia: Epidural      Intrapartum complications: None    Laceration: No    Episiotomy: No    Placenta:      Feeding method: Breastfeeding Status: Yes    Rh Immune globulin given: not applicable    Rubella vaccine given: not applicable    Presented in labor.  Spontaneous vaginal delivery.  See delivery summary.  She has done well postpartum and is requesting discharge on postpartum day #1.  She has met the expected milestones and is and satisfactory condition.  Postpartum instructions were reviewed with the patient.  Maintain pelvic rest.  Call if fever, heavy vaginal bleeding or evidence of impression Ortho pression.    Discharge Date: 2023; Discharge Time: 13:21 EDT        Plan:      Follow-up appointment with Dr. Giron in 2 weeks.     Electronically signed by Sierra Giron MD, 23, 1:21 PM EDT.

## 2023-07-19 NOTE — LACTATION NOTE
LC in to see this patient at infant's bedside and patient declined to breastfeed baby at this feeding. LC discussed pumping and she stated that she would rather wait until she goes home. LC discussed the impact this may make with breastfeeding. Mom is planning on discharge today. LC discussed storage guidelies for the NICU and how to bring milk to the NICU (on ice). LC reminded mom the importance of pumping both breasts every 3 hours. LC discussed importance that pumping should not be painful and expected breast changes and management of engorgement.LC discussed checking to make sure new medications are safe to breastfeed. LC discussed alcohol use and cigarette/second hand smoke around baby and breastfeeding and discussed the impact of street drugs on infants and breastfeeding. LC used the page in the breastfeeding guide to discuss harmful effects of these. Breastfeeding/Lactation expectations and anticipatory guidance discussed for the next two weeks . LC discussed nipple care, plugged ducts, engorgement, and breast infection.  Mom demonstrated good understanding

## 2023-07-19 NOTE — PROGRESS NOTES
"[facility]   Vaginal Delivery Progress Note    Subjective   Postpartum Day 1: Vaginal Delivery    The patient feels well.  Her pain is well controlled with nonsteroidal anti-inflammatory drugs.   She is ambulating well.  Patient describes her bleeding as moderate lochia.    Breastfeeding: has not attempted yet.    Objective     Vital Signs Range for the last 24 hours  Temperature: Temp:  [97.7 °F (36.5 °C)-98.6 °F (37 °C)] 98.1 °F (36.7 °C)   Temp Source: Temp src: Oral   BP: BP: ()/(45-70) 112/64   Pulse: Heart Rate:  [53-69] 53   Respirations: Resp:  [14-18] 18   SPO2:     O2 Amount (l/min):     O2 Devices Device (Oxygen Therapy): room air   Weight:       Admit Height:  Height: 152.4 cm (60\")      Physical Exam:  General:  no acute distress.  Abdomen: Fundus: appropriate, firm, non tender  Extremities: normal, atraumatic, no cyanosis, and trace edema.       Lab results reviewed:  Yes   Rubella:  No results found for: RUBELLAIGGIN Nurse Transcribed from prenatal record --    Rubella Antibodies, IgG   Date Value Ref Range Status   01/03/2023 2.13 Immune >0.99 index Final     Comment:                                     Non-immune       <0.90                                  Equivocal  0.90 - 0.99                                  Immune           >0.99     Rh Status:    RH type   Date Value Ref Range Status   07/18/2023 Positive  Final     Immunizations:   Immunization History   Administered Date(s) Administered    Flu Vaccine Quad PF >36MO 10/02/2017, 10/02/2017    Flu Vaccine Split Quad 10/02/2017    FluLaval/Fluzone >6mos 10/02/2017    Flublok 18+yrs 11/10/2020    Fluzone Quad >6mos (Multi-dose) 10/15/2020    HPV Quadrivalent 04/11/2014, 06/23/2014, 02/09/2015    Hep A, 2 Dose 04/11/2014, 02/09/2015    Influenza, Unspecified 10/15/2020, 11/10/2020    MCV4 Unspecified 04/11/2014    Meningococcal B,(Bexsero) 04/11/2014    Meningococcal MCV4P (Menactra) 04/11/2014    Tdap 01/31/2011, 12/12/2020    Varicella " 03/14/2013       Assessment & Plan       Normal labor    Postpartum care and examination immediately after delivery      Vaginal, Spontaneous         .      Plan:  Discharge home with standard precautions and return to clinic in 4-6 weeks.      Sierra Giron MD  7/19/2023  13:20 EDT

## 2023-07-20 LAB
CYTO UR: NORMAL
LAB AP CASE REPORT: NORMAL
LAB AP CLINICAL INFORMATION: NORMAL
PATH REPORT.FINAL DX SPEC: NORMAL
PATH REPORT.GROSS SPEC: NORMAL

## 2023-07-26 ENCOUNTER — TELEPHONE (OUTPATIENT)
Dept: LACTATION | Facility: HOSPITAL | Age: 28
End: 2023-07-26
Payer: COMMERCIAL

## 2023-07-26 NOTE — TELEPHONE ENCOUNTER
SHERINE discussed with this patient her engorgement and that her personal pump is not working. SHERINE noted that she did not use her insurance for this pump so provided her with a Spectra S2 that will be covered by her insurance. She pumped at this time and was able to express 6 oz. She had no other questions about latching and breastfeeding. She states if she does latch she uses the nipple shield.

## 2023-08-17 ENCOUNTER — OFFICE VISIT (OUTPATIENT)
Dept: FAMILY MEDICINE CLINIC | Facility: CLINIC | Age: 28
End: 2023-08-17
Payer: COMMERCIAL

## 2023-08-17 VITALS
SYSTOLIC BLOOD PRESSURE: 114 MMHG | OXYGEN SATURATION: 99 % | WEIGHT: 116.8 LBS | BODY MASS INDEX: 22.93 KG/M2 | DIASTOLIC BLOOD PRESSURE: 72 MMHG | RESPIRATION RATE: 18 BRPM | HEIGHT: 60 IN | HEART RATE: 75 BPM

## 2023-08-17 DIAGNOSIS — F41.9 ANXIETY DISORDER, UNSPECIFIED TYPE: Primary | ICD-10-CM

## 2023-08-17 PROCEDURE — 99214 OFFICE O/P EST MOD 30 MIN: CPT | Performed by: PHYSICIAN ASSISTANT

## 2023-08-17 PROCEDURE — 1160F RVW MEDS BY RX/DR IN RCRD: CPT | Performed by: PHYSICIAN ASSISTANT

## 2023-08-17 PROCEDURE — 1159F MED LIST DOCD IN RCRD: CPT | Performed by: PHYSICIAN ASSISTANT

## 2023-08-17 RX ORDER — FLUOXETINE 10 MG/1
10 CAPSULE ORAL DAILY
Qty: 90 CAPSULE | Refills: 1 | Status: SHIPPED | OUTPATIENT
Start: 2023-08-17

## 2023-08-17 NOTE — PROGRESS NOTES
Chief Complaint  Chief Complaint   Patient presents with    Med Refill       Subjective          Ashley Hoffman presents to [facility] for med refills.     History of Present Illness    Pt presents in office today for medication refills. Pt is approximately 4 weeks postpartum. Pt is requesting refill on Fluoxetine 10MG. Patient stopped Fluoxetine approximately 1mth prior to conception. Patient is not breastfeeding. Patient is tearful throughout day. Has spouse but is out of the house. Also has toddler. Little support system locally; has tried Mom's group but did not work out. Patient has been on Fluoxetine in past with good results. No thoughts of harm to self or others.    Medical History: has a past medical history of Anxiety disorder.   Surgical History: has a past surgical history that includes Dilation and curettage of uterus.   Family History: family history includes Hypertension in her paternal grandmother.   Social History: reports that she quit smoking about 3 years ago. Her smoking use included cigarettes. She started smoking about 8 years ago. She has a 1.25 pack-year smoking history. She has never used smokeless tobacco. She reports that she does not currently use alcohol. She reports that she does not use drugs.    Allergies: Patient has no known allergies.    Health Maintenance Due   Topic Date Due    ANNUAL PHYSICAL  Never done       Immunization History   Administered Date(s) Administered    Flu Vaccine Quad PF >36MO 10/02/2017, 10/02/2017    Flu Vaccine Split Quad 10/02/2017    Flublok 18+yrs 11/10/2020    Fluzone >6mos 10/02/2017    Fluzone Quad >6mos (Multi-dose) 10/15/2020    HPV Quadrivalent 04/11/2014, 06/23/2014, 02/09/2015    Hep A, 2 Dose 04/11/2014, 02/09/2015    Influenza, Unspecified 10/15/2020, 11/10/2020    MCV4 Unspecified 04/11/2014    Meningococcal B,(Bexsero) 04/11/2014    Meningococcal MCV4P (Menactra) 04/11/2014    Tdap 01/31/2011, 12/12/2020,  "07/06/2023    Varicella 03/14/2013       Objective     Vitals:    08/17/23 1230   BP: 114/72   Pulse: 75   Resp: 18   SpO2: 99%   Weight: 53 kg (116 lb 12.8 oz)   Height: 152.4 cm (60\")     Body mass index is 22.81 kg/mý.     Wt Readings from Last 3 Encounters:   08/17/23 53 kg (116 lb 12.8 oz)   07/14/23 54.3 kg (119 lb 12.8 oz)   06/27/23 54.8 kg (120 lb 12.8 oz)     BP Readings from Last 3 Encounters:   08/17/23 114/72   07/19/23 108/64   07/14/23 114/67       BMI is within normal parameters. No other follow-up for BMI required.      Patient Care Team:  Tennille Mares PA as PCP - General (Family Medicine)  Nazanin Lazo RN as Nurse Navigator (Obstetrics)    Physical Exam  Vitals and nursing note reviewed.   Constitutional:       Appearance: Normal appearance.   HENT:      Head: Normocephalic and atraumatic.   Neck:      Comments: Thyroid : gland size normal, nontender, no nodules or masses present on palpation   Cardiovascular:      Rate and Rhythm: Normal rate and regular rhythm.      Pulses: Normal pulses.      Heart sounds: Normal heart sounds.   Pulmonary:      Effort: Pulmonary effort is normal.      Breath sounds: Normal breath sounds.   Musculoskeletal:      Cervical back: Neck supple. No tenderness.   Lymphadenopathy:      Cervical: No cervical adenopathy.   Neurological:      Mental Status: She is alert.   Psychiatric:         Mood and Affect: Mood normal. Affect is tearful.         Behavior: Behavior normal.         Result Review :                           Assessment and Plan      Diagnoses and all orders for this visit:    1. Anxiety disorder, unspecified type (Primary)  Comments:  Questionable PPD: currently not stable: trial of restarting Prozac 10mg daily; discussed local support options; message if symptoms persist/worsen; consider lab  Orders:  -     FLUoxetine (PROzac) 10 MG capsule; Take 1 capsule by mouth Daily.  Dispense: 90 capsule; Refill: 1            Follow Up     Return in " about 6 months (around 2/17/2024).    Patient was given instructions and counseling regarding her condition or for health maintenance advice. Please see specific information pulled into the AVS if appropriate.

## 2023-09-26 NOTE — PROGRESS NOTES
"  POSTPARTUM Follow Up Visit      Chief Complaint   Patient presents with    Postpartum Care     HPI:      Date of delivery: 2023  Delivery type:              Delivering Provider:    Dr. Giron       Feeding: Bottle  Pain:  No  Vaginal Bleeding:  No  Depressed/Anxious:  Yes   EPDS score: 14   #10: 0  Plans for BC:  IUD  Last pap date and result: 2023 NILM      PHYSICAL EXAM:  /66   Pulse 70   Ht 152.4 cm (60\")   Wt 48.1 kg (106 lb)   Breastfeeding No Comment: Bottle feeding  BMI 20.70 kg/m²  Not found.  General- NAD, alert and oriented, appropriate  Psych- Normal mood, good memory, good eye contact  Abdomen- Soft, non distended, non tender, no masses  Lymphatic- No palpable groin nodes  Ext- No edema    ASSESSMENT AND PLAN:  Diagnoses and all orders for this visit:    1. Postpartum follow-up (Primary)    2. Encounter for other general counseling or advice on contraception   - desires copper IUD   - risks and benefits discussed  3. Anxiety disorder, unspecified type  - EPDS 14  - reviewed postpartum depression symptoms. patient started back on fluoxetine already and seeing improvement. mood is stable  - denies SI/HI        Counseling:    All birth control options reviewed in detail.  R/B/A/SE/E of each wrt pts PMHx and prior BC use  May resume normal activities  Abstinence until IUD/Nexplanon placed, BHCG day prior to placement    All BIRTH CONTROL options R/B/A/SE/E of each reviewed in detail.    Follow Up:  Return in about 4 weeks (around 10/25/2023) for ParaGard IUD.          Jeanie Ramírez DO  2023    [facility]  [facility] [place] [address]  Dept: [phone]  Dept Fax: [phone]  Loc: [phone]  Loc Fax: [phone]          "

## 2023-09-27 ENCOUNTER — POSTPARTUM VISIT (OUTPATIENT)
Dept: OBSTETRICS AND GYNECOLOGY | Facility: CLINIC | Age: 28
End: 2023-09-27
Payer: COMMERCIAL

## 2023-09-27 VITALS
HEIGHT: 60 IN | HEART RATE: 70 BPM | DIASTOLIC BLOOD PRESSURE: 66 MMHG | BODY MASS INDEX: 20.81 KG/M2 | SYSTOLIC BLOOD PRESSURE: 110 MMHG | WEIGHT: 106 LBS

## 2023-09-27 DIAGNOSIS — Z30.09 ENCOUNTER FOR OTHER GENERAL COUNSELING OR ADVICE ON CONTRACEPTION: ICD-10-CM

## 2023-09-27 DIAGNOSIS — F41.9 ANXIETY DISORDER, UNSPECIFIED TYPE: ICD-10-CM

## 2023-10-23 ENCOUNTER — PATIENT MESSAGE (OUTPATIENT)
Dept: FAMILY MEDICINE CLINIC | Facility: CLINIC | Age: 28
End: 2023-10-23
Payer: COMMERCIAL

## 2023-10-30 ENCOUNTER — OFFICE VISIT (OUTPATIENT)
Dept: FAMILY MEDICINE CLINIC | Facility: CLINIC | Age: 28
End: 2023-10-30
Payer: COMMERCIAL

## 2023-10-30 VITALS
OXYGEN SATURATION: 100 % | HEIGHT: 60 IN | RESPIRATION RATE: 18 BRPM | SYSTOLIC BLOOD PRESSURE: 82 MMHG | HEART RATE: 88 BPM | BODY MASS INDEX: 19.67 KG/M2 | WEIGHT: 100.2 LBS | DIASTOLIC BLOOD PRESSURE: 67 MMHG

## 2023-10-30 DIAGNOSIS — F41.8 ANXIETY WITH DEPRESSION: Primary | ICD-10-CM

## 2023-10-30 PROCEDURE — 1160F RVW MEDS BY RX/DR IN RCRD: CPT | Performed by: PHYSICIAN ASSISTANT

## 2023-10-30 PROCEDURE — 99214 OFFICE O/P EST MOD 30 MIN: CPT | Performed by: PHYSICIAN ASSISTANT

## 2023-10-30 PROCEDURE — 1159F MED LIST DOCD IN RCRD: CPT | Performed by: PHYSICIAN ASSISTANT

## 2023-10-30 NOTE — PROGRESS NOTES
Chief Complaint  Chief Complaint   Patient presents with    Anxiety       Subjective          Ashley Hoffman presents to [facility]  History of Present Illness    Patient presents today to follow-up on anxiety and depression.  Patient feels that she needs a mood stabilizer.  Patient is taking Prozac 10 Mg daily and feels improvement however feels that mood is labile.  She is using the Vivisen/kratom and states improvement of symptoms. Patient states that she attributes many of her symptoms to environmental causes--she lacks family and good support system in [facility],  is away frequently. She does admit to childhood trauma. Patient is 3mths postpartum. No thoughts of self harm or harm to others including children. Patient presents with both children in office. Patient had PPD with last child. Patient has genesight on file. Patient states no time for therapy. Patient is willing to see Dr. Santos.    Medical History: has a past medical history of Anxiety disorder.   Surgical History: has a past surgical history that includes Dilation and curettage of uterus.   Family History: family history includes Hypertension in her paternal grandmother.   Social History: reports that she quit smoking about 3 years ago. Her smoking use included cigarettes. She started smoking about 8 years ago. She has a 1.25 pack-year smoking history. She has never used smokeless tobacco. She reports that she does not currently use alcohol. She reports that she does not use drugs.    Allergies: Patient has no known allergies.    Health Maintenance Due   Topic Date Due    ANNUAL PHYSICAL  Never done       Immunization History   Administered Date(s) Administered    Flu Vaccine Quad PF >36MO 10/02/2017, 10/02/2017    Flu Vaccine Split Quad 10/02/2017    Flublok 18+yrs 11/10/2020    Fluzone (or Fluarix & Flulaval for VFC) >6mos 10/02/2017    Fluzone Quad >6mos (Multi-dose) 10/15/2020    HPV Quadrivalent 04/11/2014,  "06/23/2014, 02/09/2015    Hep A, 2 Dose 04/11/2014, 02/09/2015    Influenza, Unspecified 10/15/2020, 11/10/2020    MCV4 Unspecified 04/11/2014    Meningococcal B,(Bexsero) 04/11/2014    Meningococcal MCV4P (Menactra) 04/11/2014    Tdap 01/31/2011, 12/12/2020, 07/06/2023    Varicella 03/14/2013       Objective     Vitals:    10/30/23 1001   BP: (!) 82/67   BP Location: Right arm   Patient Position: Sitting   Cuff Size: Adult   Pulse: 88   Resp: 18   SpO2: 100%   Weight: 45.5 kg (100 lb 3.2 oz)   Height: 152.4 cm (60\")     Body mass index is 19.57 kg/m².     Wt Readings from Last 3 Encounters:   10/30/23 45.5 kg (100 lb 3.2 oz)   09/27/23 48.1 kg (106 lb)   08/17/23 53 kg (116 lb 12.8 oz)     BP Readings from Last 3 Encounters:   10/30/23 (!) 82/67   09/27/23 110/66   08/17/23 114/72       BMI is within normal parameters. No other follow-up for BMI required.      Patient Care Team:  Tennille Mares PA as PCP - General (Family Medicine)    Physical Exam  Psychiatric:         Attention and Perception: Attention normal.         Mood and Affect: Mood normal. Affect is tearful.         Speech: Speech normal.         Behavior: Behavior normal.         Thought Content: Thought content normal.           Result Review :                           Assessment and Plan      Diagnoses and all orders for this visit:    1. Anxiety with depression (Primary)  Comments:  Not stable: discussed options with pt in detail; increase Prozac to 20mg daily (two 10mg tab); will refer to Dr. Santos for further mgt. Advised to ana/theodore Jimenze.  Orders:  -     Ambulatory Referral to Psychiatry            Follow Up     Return if symptoms worsen or fail to improve, for message when needed..    Patient was given instructions and counseling regarding her condition or for health maintenance advice. Please see specific information pulled into the AVS if appropriate.        "

## 2023-11-13 ENCOUNTER — LAB (OUTPATIENT)
Dept: OBSTETRICS AND GYNECOLOGY | Facility: CLINIC | Age: 28
End: 2023-11-13
Payer: COMMERCIAL

## 2023-11-13 DIAGNOSIS — Z30.430 ENCOUNTER FOR IUD INSERTION: Primary | ICD-10-CM

## 2023-11-13 DIAGNOSIS — Z30.430 ENCOUNTER FOR IUD INSERTION: ICD-10-CM

## 2023-11-13 LAB — HCG INTACT+B SERPL-ACNC: <1 MIU/ML

## 2023-11-13 PROCEDURE — 84702 CHORIONIC GONADOTROPIN TEST: CPT | Performed by: STUDENT IN AN ORGANIZED HEALTH CARE EDUCATION/TRAINING PROGRAM

## 2023-11-14 ENCOUNTER — PROCEDURE VISIT (OUTPATIENT)
Dept: OBSTETRICS AND GYNECOLOGY | Facility: CLINIC | Age: 28
End: 2023-11-14
Payer: COMMERCIAL

## 2023-11-14 VITALS
HEIGHT: 60 IN | WEIGHT: 98 LBS | BODY MASS INDEX: 19.24 KG/M2 | DIASTOLIC BLOOD PRESSURE: 63 MMHG | HEART RATE: 75 BPM | SYSTOLIC BLOOD PRESSURE: 104 MMHG

## 2023-11-14 DIAGNOSIS — Z30.430 ENCOUNTER FOR IUD INSERTION: ICD-10-CM

## 2023-11-14 DIAGNOSIS — Z30.430 ENCOUNTER FOR INSERTION OF PARAGARD IUD: Primary | ICD-10-CM

## 2023-11-14 RX ORDER — COPPER 313.4 MG/1
INTRAUTERINE DEVICE INTRAUTERINE ONCE
Status: COMPLETED | OUTPATIENT
Start: 2023-11-14 | End: 2023-11-14

## 2023-11-14 RX ORDER — COPPER 313.4 MG/1
1 INTRAUTERINE DEVICE INTRAUTERINE ONCE
Status: DISCONTINUED | OUTPATIENT
Start: 2023-11-14 | End: 2023-11-14

## 2023-11-14 RX ADMIN — COPPER 1 EACH: 313.4 INTRAUTERINE DEVICE INTRAUTERINE at 10:15

## 2023-11-14 NOTE — PROGRESS NOTES
Procedures  IUD Insertion Procedure Note    Indication: Desires long acting reversible contraception     Procedure Details   Urine pregnancy test was done and was NEGATIVE .  The risks (including infection, bleeding, pain, and uterine perforation) and benefits of the procedure were explained to the patient and Written informed consent was obtained.      Cervix cleansed with Betadine. Uterus sounded to 7 cm. IUD inserted without difficulty. String visible and trimmed.    IUD Information:  ParaGard.    Condition:  Stable    Complications:  None    Patient tolerated the procedure well without complications.    Plan:  The patient was advised to call for any fever or for prolonged or severe pain or bleeding. She was advised to use OTC ibuprofen as needed for mild to moderate pain.       Jeanie Ramírez DO  11/14/2023  10:22 EST

## 2023-12-11 ENCOUNTER — TELEPHONE (OUTPATIENT)
Dept: OBSTETRICS AND GYNECOLOGY | Facility: CLINIC | Age: 28
End: 2023-12-11
Payer: COMMERCIAL

## 2023-12-11 NOTE — TELEPHONE ENCOUNTER
[facility] staff attempted to follow warm transfer process and was unsuccessful     Caller: Ashley Hoffman    Relationship to patient: Self    Best call back number: [phone]    Patient is needing: PT IS SCHEDULED TO BE SEEN 12/12/23 @ 1:00 AND REQUESTS IF SHE CAN BE SEEN SOONER, [facility] HAD AVAILABILITY WITH DR ACEVEDO @ 10:30, PLEASE ADVISE IF PT CAN BE SEEN EARLIER.

## 2023-12-11 NOTE — PROGRESS NOTES
"Post IUD Visit      CC:  Scheduled IUD check  Chief Complaint   Patient presents with    Follow-up       HPI:  Pain/Cramping:  No  Vaginal Bleeding:  No  Pain w sex: No but feels like the strings needs to be shorter  Feels strings easily:  Yes  Last PAP date and results: 1/2023 NILM    PHYSICAL EXAM:  /88   Pulse 84   Ht 152.4 cm (60\")   Wt 41.7 kg (92 lb)   LMP 10/26/2023 Comment: paragard 11/14/23  BMI 17.97 kg/m²   General- NAD, alert and oriented, appropriate  Psych- Normal mood, good memory  Abdomen- Soft, non distended, non tender, no masses  External genitalia- Normal, no lesions  Urethra- Normal, no masses, non tender  Vagina- Normal, no atrophy, no discharge, no prolapse  Bladder- Normal, no masses, non tender, no prolapse  Cervix- IUD strings visable 2cm  Uterus- Normal size, shape & consistency.  Non tender, mobile, & no prolapse      ASSESSMENT AND PLAN:    Diagnoses and all orders for this visit:    1. Encounter for routine checking of intrauterine contraceptive device (IUD) (Primary)        Counseling:  Pt was counseled to perform monthly string checks. Keep track of menses.    RTO if <q21d, >7d long, or heavy or if positive pregnancy test.    Continue OTC motrin and/or tylenol PRN cramping    Follow Up:  Return in about 1 year (around 12/12/2024), or if symptoms worsen or fail to improve, for Annual physical.          Jeanie Ramírez DO  12/12/2023    [facility]  [address] [place] [address]  Dept: [phone]  Dept Fax: [phone]  Loc: [phone]  Loc Fax: [phone]   "

## 2023-12-12 ENCOUNTER — OFFICE VISIT (OUTPATIENT)
Dept: OBSTETRICS AND GYNECOLOGY | Facility: CLINIC | Age: 28
End: 2023-12-12
Payer: COMMERCIAL

## 2023-12-12 VITALS
SYSTOLIC BLOOD PRESSURE: 128 MMHG | HEIGHT: 60 IN | WEIGHT: 92 LBS | DIASTOLIC BLOOD PRESSURE: 88 MMHG | BODY MASS INDEX: 18.06 KG/M2 | HEART RATE: 84 BPM

## 2023-12-12 DIAGNOSIS — Z30.431 ENCOUNTER FOR ROUTINE CHECKING OF INTRAUTERINE CONTRACEPTIVE DEVICE (IUD): Primary | ICD-10-CM

## 2023-12-22 ENCOUNTER — OFFICE VISIT (OUTPATIENT)
Dept: FAMILY MEDICINE CLINIC | Facility: CLINIC | Age: 28
End: 2023-12-22
Payer: COMMERCIAL

## 2023-12-22 VITALS
BODY MASS INDEX: 17.87 KG/M2 | SYSTOLIC BLOOD PRESSURE: 123 MMHG | WEIGHT: 91 LBS | HEART RATE: 91 BPM | OXYGEN SATURATION: 99 % | HEIGHT: 60 IN | DIASTOLIC BLOOD PRESSURE: 85 MMHG

## 2023-12-22 DIAGNOSIS — F41.8 ANXIETY WITH DEPRESSION: Primary | ICD-10-CM

## 2023-12-22 PROCEDURE — 99213 OFFICE O/P EST LOW 20 MIN: CPT | Performed by: PHYSICIAN ASSISTANT
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
## 2024-02-01 ENCOUNTER — OFFICE VISIT (OUTPATIENT)
Dept: FAMILY MEDICINE CLINIC | Facility: CLINIC | Age: 29
End: 2024-02-01
Payer: COMMERCIAL

## 2024-02-01 VITALS
OXYGEN SATURATION: 98 % | SYSTOLIC BLOOD PRESSURE: 125 MMHG | WEIGHT: 94 LBS | DIASTOLIC BLOOD PRESSURE: 76 MMHG | RESPIRATION RATE: 20 BRPM | BODY MASS INDEX: 18.46 KG/M2 | HEIGHT: 60 IN | HEART RATE: 84 BPM

## 2024-02-01 DIAGNOSIS — F43.9 PSYCHOLOGICAL STRESS: Primary | ICD-10-CM

## 2024-02-01 PROCEDURE — 1160F RVW MEDS BY RX/DR IN RCRD: CPT | Performed by: PHYSICIAN ASSISTANT

## 2024-02-01 PROCEDURE — 99213 OFFICE O/P EST LOW 20 MIN: CPT | Performed by: PHYSICIAN ASSISTANT

## 2024-02-01 PROCEDURE — 1159F MED LIST DOCD IN RCRD: CPT | Performed by: PHYSICIAN ASSISTANT
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
## 2024-02-15 NOTE — PROGRESS NOTES
IUD Removal      No chief complaint on file.      I reviewed the procedure in detail.  She understand the potential risks include, but are not limited to, pain, bleeding, and infection.  Her questions have been answered.    Subjective:  ***    Objective:  There were no vitals taken for this visit.  General- NAD, alert and oriented, appropriate  Psych- Normal mood, good memory  Abdomen- Soft, non distended, non tender, no masses  External genitalia- Normal, no lesions  Vagina- Normal, no discharge  Cervix- Normal without lesion or discharge. {IUD REMOVAL (Optional):87378}    Patient tolerated the procedure well.***    ASSESSMENT AND PLAN:    There are no diagnoses linked to this encounter.      Counseling:  She understand she can become pregnant immediately.  I recommend she keep track of menses and RTO if <q21d, >7d long, heavy or painful.    R/B/A/SE/efficacy of all BC options reviewed with respect to her individual medical history.   She has decided on {APCONTRACEPTIONPP:16291} for BC.  If she desires pregnancy I have encouraged a healthy lifestyle and PNV.   {Time Spent-Use for E/M Coding (Optional):17250}    PRECAUTIONS - She was advised to watch for fever, chills, vaginal discharge or odor.      Follow Up:  No follow-ups on file.        Kristen Bleser, MA  02/21/2024    [facility]  [facility] [place] [address]  Dept: [phone]  Dept Fax: [phone]  Loc: [phone]  Loc Fax: [phone]

## 2024-02-19 NOTE — PROGRESS NOTES
IUD Removal      No chief complaint on file.      I reviewed the procedure in detail.  She understand the potential risks include, but are not limited to, pain, bleeding, and infection.  Her questions have been answered.    Subjective:  ***    Objective:  There were no vitals taken for this visit.  General- NAD, alert and oriented, appropriate  Psych- Normal mood, good memory  Abdomen- Soft, non distended, non tender, no masses  External genitalia- Normal, no lesions  Vagina- Normal, no discharge  Cervix- Normal without lesion or discharge. {IUD REMOVAL (Optional):26025}    Patient tolerated the procedure well.***    ASSESSMENT AND PLAN:    There are no diagnoses linked to this encounter.      Counseling:  She understand she can become pregnant immediately.  I recommend she keep track of menses and RTO if <q21d, >7d long, heavy or painful.    R/B/A/SE/efficacy of all BC options reviewed with respect to her individual medical history.   She has decided on {APCONTRACEPTIONPP:38789} for BC.  If she desires pregnancy I have encouraged a healthy lifestyle and PNV.   {Time Spent-Use for E/M Coding (Optional):88023}    PRECAUTIONS - She was advised to watch for fever, chills, vaginal discharge or odor.      Follow Up:  No follow-ups on file.        Kristen Bleser, MA  02/21/2024    [facility]  [facility] [place] [address]  Dept: [phone]  Dept Fax: [phone]  Loc: [phone]  Loc Fax: [phone]

## 2024-02-21 ENCOUNTER — PROCEDURE VISIT (OUTPATIENT)
Dept: OBSTETRICS AND GYNECOLOGY | Facility: CLINIC | Age: 29
End: 2024-02-21
Payer: COMMERCIAL

## 2024-02-21 VITALS
SYSTOLIC BLOOD PRESSURE: 112 MMHG | DIASTOLIC BLOOD PRESSURE: 73 MMHG | HEART RATE: 105 BPM | BODY MASS INDEX: 19.36 KG/M2 | HEIGHT: 60 IN | WEIGHT: 98.6 LBS

## 2024-02-21 DIAGNOSIS — Z30.432 ENCOUNTER FOR IUD REMOVAL: ICD-10-CM

## 2024-02-21 DIAGNOSIS — Z30.011 ENCOUNTER FOR INITIAL PRESCRIPTION OF CONTRACEPTIVE PILLS: Primary | ICD-10-CM

## 2024-02-21 RX ORDER — NORETHINDRONE ACETATE AND ETHINYL ESTRADIOL 1MG-20(21)
1 KIT ORAL DAILY
Qty: 28 TABLET | Refills: 11 | Status: SHIPPED | OUTPATIENT
Start: 2024-02-21 | End: 2025-02-20

## 2024-02-21 NOTE — PROGRESS NOTES
Procedures  IUD Removal Procedure Note    Type of IUD:  ParaGard  Date of insertion:  known  Reason for removal:  Side effect: cramping, irregular spotting  Other relevant history/information:  none    Procedure Time Documentation  The risks of the procedure were reviewed with the patient including bleeding, infection and unlikely damage to the uterus and the benefits of the procedure were explained to the patient and Written informed consent was obtained    Procedure Details  IUD strings visible:  yes  Local anesthesia:  None  Tenaculum used:  None  Removal:  IUD strings grasped and IUD removed intact with gentle traction.  The patient tolerated the procedure well.    All appropriate instructions regarding removal were reviewed.    Patient tolerated the procedure well without complications.    Plans for contraception:  oral contraceptives (estrogen/progesterone)    Other follow-up needed:  none    The patient was advised to call for any fever or for prolonged or severe pain or bleeding. She was advised to use OTC analgesics as needed for mild to moderate pain.     Jeanie Ramírez DO  2/21/2024  15:25 EST

## 2024-02-22 DIAGNOSIS — F41.9 ANXIETY DISORDER, UNSPECIFIED TYPE: ICD-10-CM

## 2024-02-23 RX ORDER — FLUOXETINE HYDROCHLORIDE 20 MG/1
20 CAPSULE ORAL DAILY
Qty: 90 CAPSULE | Refills: 1 | Status: SHIPPED | OUTPATIENT
Start: 2024-02-23

## 2024-10-04 DIAGNOSIS — F41.9 ANXIETY DISORDER, UNSPECIFIED TYPE: ICD-10-CM

## 2024-10-04 RX ORDER — FLUOXETINE 10 MG/1
10 CAPSULE ORAL DAILY
Qty: 90 CAPSULE | Refills: 0 | OUTPATIENT
Start: 2024-10-04

## 2024-10-04 NOTE — TELEPHONE ENCOUNTER
I haven't seen patient since February. Is she still in the area (she was attempting to move to [place])? Is she still on Prozac 20mg daily?

## 2024-10-10 NOTE — TELEPHONE ENCOUNTER
HUB TO RELAY AND SCHEDULE        CALLED PATIENT TO SCHEDULE AN APPT FOR MED REFILL, LEFT VM.

## 2024-10-11 NOTE — TELEPHONE ENCOUNTER
HUB TO RELAY AND SCHEDULE        2ND ATTEMPT: CALLED PATIENT TO SCHEDULE AN APPT FOR MED REFILL, LEFT VM.

## 2024-10-14 NOTE — TELEPHONE ENCOUNTER
HUB TO RELAY AND SCHEDULE        3RD ATTEMPT: CALLED PATIENT TO SCHEDULE AN APPT FOR MED REFILL, LEFT VM.

## 2024-11-22 ENCOUNTER — OFFICE VISIT (OUTPATIENT)
Dept: FAMILY MEDICINE CLINIC | Facility: CLINIC | Age: 29
End: 2024-11-22

## 2024-11-22 VITALS
HEIGHT: 60 IN | SYSTOLIC BLOOD PRESSURE: 108 MMHG | DIASTOLIC BLOOD PRESSURE: 73 MMHG | BODY MASS INDEX: 20.69 KG/M2 | HEART RATE: 82 BPM | WEIGHT: 105.4 LBS | OXYGEN SATURATION: 100 %

## 2024-11-22 DIAGNOSIS — T26.92XA CHEMICAL BURN OF LEFT EYE: Primary | ICD-10-CM

## 2024-11-22 NOTE — LETTER
November 22, 2024     Patient: Ashley Hoffman   YOB: 1995   Date of Visit: 11/22/2024       To Whom It May Concern:    It is my medical opinion that Ashley Hoffman may return to work on Monday 11/25/24. She was seen in my office 11/22/2024.        Sincerely,        GUCCI Dean    CC: No Recipients
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
## 2024-12-13 DIAGNOSIS — Z30.011 ENCOUNTER FOR INITIAL PRESCRIPTION OF CONTRACEPTIVE PILLS: ICD-10-CM

## 2024-12-13 RX ORDER — NORETHINDRONE ACETATE/ETHINYL ESTRADIOL AND FERROUS FUMARATE 1MG-20(21)
1 KIT ORAL DAILY
Qty: 28 TABLET | Refills: 0 | OUTPATIENT
Start: 2024-12-13

## 2024-12-13 NOTE — TELEPHONE ENCOUNTER
I received a refill request for Elton Fe.  The pt was last seen on 02/21/2024 and has a follow up scheduled on 01/30/25.  The Rx was last sent to the pharmacy on 02/21/24 with 12 refills total.  I have denied the request because the current Rx is still active and there are refills left that are good until February.

## 2024-12-17 DIAGNOSIS — Z30.011 ENCOUNTER FOR INITIAL PRESCRIPTION OF CONTRACEPTIVE PILLS: ICD-10-CM

## 2024-12-18 RX ORDER — NORETHINDRONE ACETATE/ETHINYL ESTRADIOL AND FERROUS FUMARATE 1MG-20(21)
1 KIT ORAL DAILY
Qty: 28 TABLET | Refills: 0 | Status: SHIPPED | OUTPATIENT
Start: 2024-12-18

## 2025-01-09 DIAGNOSIS — Z30.011 ENCOUNTER FOR INITIAL PRESCRIPTION OF CONTRACEPTIVE PILLS: ICD-10-CM

## 2025-01-09 RX ORDER — NORETHINDRONE ACETATE/ETHINYL ESTRADIOL AND FERROUS FUMARATE 1MG-20(21)
1 KIT ORAL DAILY
Qty: 28 TABLET | Refills: 0 | OUTPATIENT
Start: 2025-01-09

## 2025-01-09 RX ORDER — NORETHINDRONE ACETATE/ETHINYL ESTRADIOL AND FERROUS FUMARATE 1MG-20(21)
1 KIT ORAL DAILY
Qty: 28 TABLET | Refills: 0 | Status: SHIPPED | OUTPATIENT
Start: 2025-01-09

## 2025-01-31 DIAGNOSIS — Z30.011 ENCOUNTER FOR INITIAL PRESCRIPTION OF CONTRACEPTIVE PILLS: ICD-10-CM

## 2025-01-31 RX ORDER — NORETHINDRONE ACETATE/ETHINYL ESTRADIOL AND FERROUS FUMARATE 1MG-20(21)
1 KIT ORAL DAILY
Qty: 28 TABLET | Refills: 1 | Status: SHIPPED | OUTPATIENT
Start: 2025-01-31

## 2025-03-05 NOTE — PROGRESS NOTES
"Well Woman Visit    CC: Scheduled annual well gyn visit  Chief Complaint   Patient presents with    Gynecologic Exam         HPI:     History of Present Illness  The patient is a 30-year-old female who presents today for an annual exam.    She reports no abnormalities in her vaginal area, including discharge, itching, or burning sensations. Her menstrual cycles are characterized by very light bleeding, with spotting occurring approximately every 3 months. She has been utilizing birth control methods and currently has one pack remaining. She has not detected any lumps or bumps in her breasts. She expresses no additional concerns or queries at this time.      History: PMHx, Meds, Allergies, PSHx, Social Hx, and POBHx all reviewed and updated.  Last Pap :   Last Completed Pap Smear            PAP SMEAR (Every 3 Years) Next due on 1/3/2026      01/03/2023  IGP,CtNgTv,Apt HPV,rfx 16 / 18,45    11/02/2020  Done    06/29/2020  SCANNED - PAP SMEAR                  Last MMG :   Last Completed Mammogram       This patient has no relevant Health Maintenance data.           Last Colonoscopy :   Last Completed Colonoscopy       This patient has no relevant Health Maintenance data.              Results          ROS:   General ROS: negative for chills or fatigue  Breast ROS: negative new or changing breast lumps  Gastrointestinal ROS: negative for abdominal pain or appetite loss  Genito-Urinary ROS: negative for difficulty in urination or vaginal irritation   Psych ROS: negative for depression      PHYSICAL EXAM:       /70   Pulse 81   Ht 152.4 cm (60\")   Wt 44 kg (97 lb)   LMP 02/24/2025   Breastfeeding No   BMI 18.94 kg/m²  Not found.    General- NAD, alert and oriented, appropriate  Psych- Normal mood, good memory  Resp- No labored breathing  Abdomen- Soft, non distended, non tender    Breast Exam:  Breast self awareness counseled  Breast left-  Bilaterally symmetrical, no masses, non tender, no nipple " discharge  Breast right- Bilaterally symmetrical, no masses, non tender, no nipple discharge    Pelvic Exam with chaperone present:    External genitalia- Normal female, no lesions  Urethra/meatus- Normal, no masses, non tender  Bladder- Normal, no masses, non tender  Vagina- Normal, no atrophy, no lesions, no discharge.    Cvx- Normal, no lesions, no discharge, No cervical motion tenderness  Uterus- Normal size, shape & consistency.  Non tender, mobile.  Adnexa- No mass, non tender  Anus/Rectum/Perineum- Not performed    Ext- No edema  Skin- No lesions, no rashes, no acanthosis nigricans      ASSESSMENT and PLAN:    Diagnoses and all orders for this visit:    1. Encounter for initial prescription of contraceptive pills  -     norethindrone-ethinyl estradiol FE (Elton Fe 1/20) 1-20 MG-MCG per tablet; Take 1 tablet by mouth Daily.  Dispense: 84 tablet; Refill: 3          Assessment & Plan  1. Annual examination.  She reports no issues with discharge, itching, or burning. Periods are very light, occurring as spotting every 3 months. No lumps or bumps in her breasts were noted. A pelvic exam was performed, and everything appeared normal. Pap smear is due in 2026. A prescription for her birth control will be sent to her pharmacy.      Preventative:  1. Annuals every year; Cytology collections per prevailing guidelines.   2. Mammograms begin every year at 39 yo if no abnormalities are found and no family history.  3. Bone density studies begin at 66 yo. If no fracture history or osteoporosis family history.  4. Colonoscopy begins at 46 yo. Repeat every ten years if negative and no family history.  5. Calcium of 5086-2157 mg/day in split dosing  6. Vitamin D 400-800 IU/day  7. All other preventative health recommendations will be managed by the patients Primary care physician.    She understands the importance of having any ordered tests to be performed in a timely fashion.  The risks of not performing them include, but  are not limited to, advanced cancer stages, bone loss from osteoporosis and/or subsequent increase in morbidity and/or mortality.  She is encouraged to review her results online and/or contact or office if she has questions.     Follow Up:  Return in about 1 year (around 3/6/2026) for Annual physical.    Patient or patient representative verbalized consent for the use of Ambient Listening during the visit with  Jeanie Ramírez DO for chart documentation. 3/6/2025  11:11 EST            Jeanie Ramírez DO  03/06/2025    [facility]  [address] [place] [address]  Dept: [phone]  Dept Fax: [phone]  Loc: [phone]  Loc Fax: [phone]

## 2025-03-06 ENCOUNTER — OFFICE VISIT (OUTPATIENT)
Dept: OBSTETRICS AND GYNECOLOGY | Facility: CLINIC | Age: 30
End: 2025-03-06
Payer: MEDICAID

## 2025-03-06 VITALS
HEART RATE: 81 BPM | SYSTOLIC BLOOD PRESSURE: 126 MMHG | BODY MASS INDEX: 19.04 KG/M2 | WEIGHT: 97 LBS | DIASTOLIC BLOOD PRESSURE: 70 MMHG | HEIGHT: 60 IN

## 2025-03-06 DIAGNOSIS — Z30.011 ENCOUNTER FOR INITIAL PRESCRIPTION OF CONTRACEPTIVE PILLS: ICD-10-CM

## 2025-03-06 RX ORDER — NORETHINDRONE ACETATE AND ETHINYL ESTRADIOL 1MG-20(21)
1 KIT ORAL DAILY
Qty: 84 TABLET | Refills: 3 | Status: SHIPPED | OUTPATIENT
Start: 2025-03-06